# Patient Record
Sex: FEMALE | Race: WHITE | NOT HISPANIC OR LATINO | Employment: OTHER | URBAN - METROPOLITAN AREA
[De-identification: names, ages, dates, MRNs, and addresses within clinical notes are randomized per-mention and may not be internally consistent; named-entity substitution may affect disease eponyms.]

---

## 2017-07-20 ENCOUNTER — LAB VISIT (OUTPATIENT)
Dept: LAB | Facility: HOSPITAL | Age: 62
End: 2017-07-20
Attending: INTERNAL MEDICINE
Payer: COMMERCIAL

## 2017-07-20 ENCOUNTER — OFFICE VISIT (OUTPATIENT)
Dept: FAMILY MEDICINE | Facility: CLINIC | Age: 62
End: 2017-07-20
Payer: COMMERCIAL

## 2017-07-20 VITALS
TEMPERATURE: 98 F | WEIGHT: 170 LBS | OXYGEN SATURATION: 96 % | RESPIRATION RATE: 18 BRPM | HEIGHT: 71 IN | DIASTOLIC BLOOD PRESSURE: 100 MMHG | SYSTOLIC BLOOD PRESSURE: 166 MMHG | BODY MASS INDEX: 23.8 KG/M2 | HEART RATE: 76 BPM

## 2017-07-20 DIAGNOSIS — Z12.11 ENCOUNTER FOR SCREENING COLONOSCOPY: ICD-10-CM

## 2017-07-20 DIAGNOSIS — S29.012A STRAIN OF MUSCLE AND TENDON OF BACK WALL OF THORAX, INITIAL ENCOUNTER: Primary | ICD-10-CM

## 2017-07-20 DIAGNOSIS — R03.0 ELEVATED BLOOD PRESSURE READING: ICD-10-CM

## 2017-07-20 DIAGNOSIS — Z11.59 NEED FOR HEPATITIS C SCREENING TEST: ICD-10-CM

## 2017-07-20 DIAGNOSIS — Z00.00 HEALTH CARE MAINTENANCE: ICD-10-CM

## 2017-07-20 DIAGNOSIS — Z13.220 LIPID SCREENING: ICD-10-CM

## 2017-07-20 DIAGNOSIS — Z12.31 ENCOUNTER FOR SCREENING MAMMOGRAM FOR BREAST CANCER: ICD-10-CM

## 2017-07-20 DIAGNOSIS — Z12.4 PAP SMEAR FOR CERVICAL CANCER SCREENING: ICD-10-CM

## 2017-07-20 LAB
ALBUMIN SERPL BCP-MCNC: 4 G/DL
ALP SERPL-CCNC: 81 U/L
ALT SERPL W/O P-5'-P-CCNC: 18 U/L
ANION GAP SERPL CALC-SCNC: 9 MMOL/L
AST SERPL-CCNC: 24 U/L
BASOPHILS # BLD AUTO: 0.03 K/UL
BASOPHILS NFR BLD: 0.5 %
BILIRUB SERPL-MCNC: 0.8 MG/DL
BUN SERPL-MCNC: 18 MG/DL
CALCIUM SERPL-MCNC: 9.6 MG/DL
CHLORIDE SERPL-SCNC: 107 MMOL/L
CHOLEST/HDLC SERPL: 3.1 {RATIO}
CO2 SERPL-SCNC: 25 MMOL/L
CREAT SERPL-MCNC: 0.9 MG/DL
DIFFERENTIAL METHOD: NORMAL
EOSINOPHIL # BLD AUTO: 0.2 K/UL
EOSINOPHIL NFR BLD: 3.6 %
ERYTHROCYTE [DISTWIDTH] IN BLOOD BY AUTOMATED COUNT: 13.3 %
EST. GFR  (AFRICAN AMERICAN): >60 ML/MIN/1.73 M^2
EST. GFR  (NON AFRICAN AMERICAN): >60 ML/MIN/1.73 M^2
GLUCOSE SERPL-MCNC: 72 MG/DL
HCT VFR BLD AUTO: 42.3 %
HDL/CHOLESTEROL RATIO: 32.3 %
HDLC SERPL-MCNC: 223 MG/DL
HDLC SERPL-MCNC: 72 MG/DL
HGB BLD-MCNC: 14.1 G/DL
LDLC SERPL CALC-MCNC: 136.8 MG/DL
LYMPHOCYTES # BLD AUTO: 1.5 K/UL
LYMPHOCYTES NFR BLD: 26.3 %
MCH RBC QN AUTO: 30.1 PG
MCHC RBC AUTO-ENTMCNC: 33.3 G/DL
MCV RBC AUTO: 90 FL
MONOCYTES # BLD AUTO: 0.5 K/UL
MONOCYTES NFR BLD: 7.9 %
NEUTROPHILS # BLD AUTO: 3.6 K/UL
NEUTROPHILS NFR BLD: 61.7 %
NONHDLC SERPL-MCNC: 151 MG/DL
PLATELET # BLD AUTO: 293 K/UL
PMV BLD AUTO: 10.9 FL
POTASSIUM SERPL-SCNC: 4.5 MMOL/L
PROT SERPL-MCNC: 7.6 G/DL
RBC # BLD AUTO: 4.68 M/UL
SODIUM SERPL-SCNC: 141 MMOL/L
TRIGL SERPL-MCNC: 71 MG/DL
TSH SERPL DL<=0.005 MIU/L-ACNC: 2.6 UIU/ML
WBC # BLD AUTO: 5.79 K/UL

## 2017-07-20 PROCEDURE — 80053 COMPREHEN METABOLIC PANEL: CPT

## 2017-07-20 PROCEDURE — 80061 LIPID PANEL: CPT

## 2017-07-20 PROCEDURE — 84443 ASSAY THYROID STIM HORMONE: CPT

## 2017-07-20 PROCEDURE — 85025 COMPLETE CBC W/AUTO DIFF WBC: CPT

## 2017-07-20 PROCEDURE — 36415 COLL VENOUS BLD VENIPUNCTURE: CPT

## 2017-07-20 PROCEDURE — 99999 PR PBB SHADOW E&M-EST. PATIENT-LVL IV: CPT | Mod: PBBFAC,,, | Performed by: INTERNAL MEDICINE

## 2017-07-20 PROCEDURE — 99203 OFFICE O/P NEW LOW 30 MIN: CPT | Mod: S$GLB,,, | Performed by: INTERNAL MEDICINE

## 2017-07-20 PROCEDURE — 86803 HEPATITIS C AB TEST: CPT

## 2017-07-20 NOTE — PROGRESS NOTES
SUBJECTIVE     Chief Complaint   Patient presents with    Establish Care    Flank Pain     R side sharp piercing pain x 2 days       HPI  Maryann Grant is a 61 y.o. female with multiple medical diagnoses as listed in the medical history and problem list that presents for evaluation of R flank pain x 2 days. Pt reports pain is sharp and stabbing at a 10/10, but has decreased to a 4-5/10. Pain is improved with sitting up, but worsened with laying on the R side and taking deep breaths. Pt has never had anything like this happen to her before. Pain is intermittent in nature without any radiation. Pt did not take any meds for her symptoms. Denies any urinary symptoms/complaints or hematuria. Pt did  2 packs of 16.9 oz 40 pack of water at the grocery store yesterday with proper lifting techniques. Denies any recent trauma or falls.     PAST MEDICAL HISTORY:  Past Medical History:   Diagnosis Date    History of bleeding ulcers        PAST SURGICAL HISTORY:  Past Surgical History:   Procedure Laterality Date    HYSTERECTOMY      TONSILLECTOMY         SOCIAL HISTORY:  Social History     Social History    Marital status:      Spouse name: N/A    Number of children: N/A    Years of education: N/A     Occupational History    Not on file.     Social History Main Topics    Smoking status: Never Smoker    Smokeless tobacco: Not on file    Alcohol use Not on file    Drug use: No    Sexual activity: Yes     Partners: Male     Birth control/ protection: Post-menopausal     Other Topics Concern    Not on file     Social History Narrative    No narrative on file       FAMILY HISTORY:  Family History   Problem Relation Age of Onset    Heart disease Mother     Stroke Mother     Diabetes Mother     Heart disease Father     Hypertension Father        ALLERGIES AND MEDICATIONS: updated and reviewed.  Review of patient's allergies indicates:  No Known Allergies  No current outpatient prescriptions on file.  "    No current facility-administered medications for this visit.        ROS  Review of Systems   Constitutional: Negative for chills and fever.   HENT: Negative for hearing loss and sore throat.    Eyes: Negative for visual disturbance.   Respiratory: Negative for cough and shortness of breath.    Cardiovascular: Negative for chest pain, palpitations and leg swelling.   Gastrointestinal: Negative for abdominal pain, constipation, diarrhea, nausea and vomiting.   Genitourinary: Negative for dysuria, frequency and urgency.   Musculoskeletal: Negative for arthralgias, joint swelling and myalgias.        R flank pain   Skin: Negative for rash and wound.   Neurological: Negative for headaches.   Psychiatric/Behavioral: Negative for agitation and confusion. The patient is not nervous/anxious.          OBJECTIVE     Physical Exam  Vitals:    07/20/17 0948   BP: (!) 166/100   Pulse: 76   Resp: 18   Temp: 98.1 °F (36.7 °C)    Body mass index is 23.71 kg/m².  Weight: 77.1 kg (169 lb 15.6 oz)   Height: 5' 11" (180.3 cm)     Physical Exam   Constitutional: She is oriented to person, place, and time. She appears well-developed and well-nourished. No distress.   HENT:   Head: Normocephalic and atraumatic.   Right Ear: External ear normal.   Left Ear: External ear normal.   Nose: Nose normal.   Mouth/Throat: Oropharynx is clear and moist.   Eyes: Conjunctivae and EOM are normal. Right eye exhibits no discharge. Left eye exhibits no discharge. No scleral icterus.   Neck: Normal range of motion. Neck supple. No JVD present. No tracheal deviation present.   Cardiovascular: Normal rate, regular rhythm and intact distal pulses.  Exam reveals no gallop and no friction rub.    No murmur heard.  Pulmonary/Chest: Effort normal and breath sounds normal. No respiratory distress. She has no wheezes.   Abdominal: Soft. Bowel sounds are normal. She exhibits no distension and no mass. There is no tenderness. There is no rebound, no guarding and " no CVA tenderness.   Musculoskeletal: Normal range of motion. She exhibits tenderness (R flank pain slightly TTP over ~T11-T12). She exhibits no edema or deformity.   Neurological: She is alert and oriented to person, place, and time. She exhibits normal muscle tone. Coordination normal.   Skin: Skin is warm and dry. No rash noted. No erythema.   Psychiatric: She has a normal mood and affect. Her behavior is normal. Judgment and thought content normal.         Health Maintenance       Date Due Completion Date    Hepatitis C Screening 1955 ---    TETANUS VACCINE 10/08/1972 ---    Mammogram 10/08/1994 ---    Colonoscopy 10/08/2004 ---    Zoster Vaccine 10/08/2014 ---    Influenza Vaccine 08/01/2017 11/7/2014    Lipid Panel 09/15/2017 9/15/2012            ASSESSMENT     61 y.o. female with     1. Strain of muscle and tendon of back wall of thorax, initial encounter    2. Elevated blood pressure reading    3. Encounter for screening mammogram for breast cancer    4. Encounter for screening colonoscopy    5. Pap smear for cervical cancer screening    6. Need for hepatitis C screening test    7. Health care maintenance    8. Lipid screening        PLAN:     1. Strain of muscle and tendon of back wall of thorax, initial encounter  - Pt did some heavy lifting yesterday and likely sustained a muscle strain to the R flank area  - Advised on conservative management with rest and ice for the next 48-72 hours; pt to then switch to hot compresses with care not to burn herself  - Okay to take OTC NSAIDs prn pain    2. Elevated blood pressure reading  - BP elevated above goal of <140/90; possibly 2/2 pain  - Monitor    3. Encounter for screening mammogram for breast cancer  - Mammo Digital Screening Bilat with Tomosynthesis CAD; Future    4. Encounter for screening colonoscopy  - Occult blood x 1, stool; Future  - Occult blood x 1, stool; Future  - Occult blood x 1, stool; Future    5. Pap smear for cervical cancer  screening  - Ambulatory referral to Obstetrics / Gynecology    6. Need for hepatitis C screening test  - Hepatitis C antibody; Future    7. Health care maintenance  - CBC auto differential; Future  - Comprehensive metabolic panel; Future  - TSH; Future    8. Lipid screening  - Lipid panel; Future        RTC in 4 weeks for nurse visit BP check     Risa Cochran MD  07/20/2017 10:24 AM        No Follow-up on file.

## 2017-07-21 LAB — HCV AB SERPL QL IA: NEGATIVE

## 2017-07-25 ENCOUNTER — PATIENT MESSAGE (OUTPATIENT)
Dept: FAMILY MEDICINE | Facility: CLINIC | Age: 62
End: 2017-07-25

## 2017-08-01 ENCOUNTER — APPOINTMENT (OUTPATIENT)
Dept: RADIOLOGY | Facility: HOSPITAL | Age: 62
End: 2017-08-01
Attending: INTERNAL MEDICINE
Payer: COMMERCIAL

## 2017-08-01 ENCOUNTER — OFFICE VISIT (OUTPATIENT)
Dept: FAMILY MEDICINE | Facility: CLINIC | Age: 62
End: 2017-08-01
Payer: COMMERCIAL

## 2017-08-01 VITALS
HEIGHT: 71 IN | WEIGHT: 168.88 LBS | HEART RATE: 96 BPM | DIASTOLIC BLOOD PRESSURE: 84 MMHG | TEMPERATURE: 98 F | SYSTOLIC BLOOD PRESSURE: 142 MMHG | BODY MASS INDEX: 23.64 KG/M2 | OXYGEN SATURATION: 95 %

## 2017-08-01 DIAGNOSIS — R10.9 ACUTE RIGHT FLANK PAIN: Primary | ICD-10-CM

## 2017-08-01 DIAGNOSIS — E78.5 HYPERLIPIDEMIA, UNSPECIFIED HYPERLIPIDEMIA TYPE: ICD-10-CM

## 2017-08-01 DIAGNOSIS — R03.0 ELEVATED BLOOD PRESSURE READING: ICD-10-CM

## 2017-08-01 DIAGNOSIS — R10.9 ACUTE RIGHT FLANK PAIN: ICD-10-CM

## 2017-08-01 PROCEDURE — 99214 OFFICE O/P EST MOD 30 MIN: CPT | Mod: S$GLB,,, | Performed by: INTERNAL MEDICINE

## 2017-08-01 PROCEDURE — 71100 X-RAY EXAM RIBS UNI 2 VIEWS: CPT | Mod: TC,PN

## 2017-08-01 PROCEDURE — 99999 PR PBB SHADOW E&M-EST. PATIENT-LVL IV: CPT | Mod: PBBFAC,,, | Performed by: INTERNAL MEDICINE

## 2017-08-01 PROCEDURE — 87086 URINE CULTURE/COLONY COUNT: CPT

## 2017-08-01 PROCEDURE — 71100 X-RAY EXAM RIBS UNI 2 VIEWS: CPT | Mod: 26,,, | Performed by: RADIOLOGY

## 2017-08-01 PROCEDURE — 81000 URINALYSIS NONAUTO W/SCOPE: CPT

## 2017-08-01 RX ORDER — ACETAMINOPHEN AND CODEINE PHOSPHATE 300; 30 MG/1; MG/1
1 TABLET ORAL DAILY PRN
Qty: 10 TABLET | Refills: 0 | Status: SHIPPED | OUTPATIENT
Start: 2017-08-01 | End: 2017-08-11

## 2017-08-01 NOTE — PROGRESS NOTES
SUBJECTIVE     Chief Complaint   Patient presents with    Flank Pain     Still have same pain since last OV       HPI  Maryann Grant is a 61 y.o. female with multiple medical diagnoses as listed in the medical history and problem list that presents for follow-up of persistent R flank pain x 2 weeks. Pt reports her pain is like a bruising pain at a 8-10/10 only when laying on or rolling onto her sides. Pt has been taking Tylenol and Ibuprofen without any improvement symptoms. She has been drinking lots of water but has not had any improvement of pain. Denies any dysuria, increased urinary frequency, urgency, or hematuria. Of note, pt had been treated with Abx( 2 days of Cipro followed by 7 days of Bactrim, because the Cipro caused shooting pains in the L flank) 2 weeks prior to the start of this pain. Denies any fever, chills, or night sweats.    PAST MEDICAL HISTORY:  Past Medical History:   Diagnosis Date    History of bleeding ulcers        PAST SURGICAL HISTORY:  Past Surgical History:   Procedure Laterality Date    HYSTERECTOMY      TONSILLECTOMY         SOCIAL HISTORY:  Social History     Social History    Marital status:      Spouse name: N/A    Number of children: N/A    Years of education: N/A     Occupational History    Not on file.     Social History Main Topics    Smoking status: Never Smoker    Smokeless tobacco: Not on file    Alcohol use Not on file    Drug use: No    Sexual activity: Yes     Partners: Male     Birth control/ protection: Post-menopausal     Other Topics Concern    Not on file     Social History Narrative    No narrative on file       FAMILY HISTORY:  Family History   Problem Relation Age of Onset    Heart disease Mother     Stroke Mother     Diabetes Mother     Heart disease Father     Hypertension Father        ALLERGIES AND MEDICATIONS: updated and reviewed.  Review of patient's allergies indicates:  No Known Allergies  Current Outpatient Prescriptions  "  Medication Sig Dispense Refill    acetaminophen-codeine 300-30mg (TYLENOL #3) 300-30 mg Tab Take 1 tablet by mouth daily as needed. 10 tablet 0     No current facility-administered medications for this visit.        ROS  Review of Systems   Constitutional: Negative for chills and fever.   HENT: Negative for hearing loss and sore throat.    Eyes: Negative for visual disturbance.   Respiratory: Negative for cough and shortness of breath.    Cardiovascular: Negative for chest pain, palpitations and leg swelling.   Gastrointestinal: Negative for abdominal pain, constipation, diarrhea, nausea and vomiting.   Genitourinary: Negative for dysuria, frequency and urgency.   Musculoskeletal: Negative for arthralgias, joint swelling and myalgias.        R flank pain   Skin: Negative for rash and wound.   Neurological: Negative for headaches.   Psychiatric/Behavioral: Negative for agitation and confusion. The patient is not nervous/anxious.          OBJECTIVE     Physical Exam  Vitals:    08/01/17 1307   BP: (!) 142/84   Pulse: 96   Temp: 98.3 °F (36.8 °C)    Body mass index is 23.55 kg/m².  Weight: 76.6 kg (168 lb 14 oz)   Height: 5' 11" (180.3 cm)     Physical Exam   Constitutional: She is oriented to person, place, and time. She appears well-developed and well-nourished. No distress.   HENT:   Head: Normocephalic and atraumatic.   Right Ear: External ear normal.   Left Ear: External ear normal.   Nose: Nose normal.   Mouth/Throat: Oropharynx is clear and moist.   Eyes: Conjunctivae and EOM are normal. Right eye exhibits no discharge. Left eye exhibits no discharge. No scleral icterus.   Neck: Normal range of motion. Neck supple. No JVD present. No tracheal deviation present.   Cardiovascular: Normal rate, regular rhythm and intact distal pulses.  Exam reveals no gallop and no friction rub.    No murmur heard.  Pulmonary/Chest: Effort normal and breath sounds normal. No respiratory distress. She has no wheezes.   Abdominal: " Soft. Bowel sounds are normal. She exhibits no distension and no mass. There is no tenderness. There is CVA tenderness (right). There is no rebound and no guarding.   Musculoskeletal: Normal range of motion. She exhibits no edema, tenderness or deformity.   Neurological: She is alert and oriented to person, place, and time. She exhibits normal muscle tone. Coordination normal.   Skin: Skin is warm and dry. No rash noted. No erythema.   Psychiatric: She has a normal mood and affect. Her behavior is normal. Judgment and thought content normal.         Health Maintenance       Date Due Completion Date    TETANUS VACCINE 10/08/1972 ---    Mammogram 10/08/1994 ---    Colonoscopy 10/08/2004 ---    Zoster Vaccine 10/08/2014 ---    Influenza Vaccine 08/01/2017 11/7/2014    Lipid Panel 07/20/2022 7/20/2017            ASSESSMENT     61 y.o. female with     1. Acute right flank pain    2. Hyperlipidemia, unspecified hyperlipidemia type    3. Elevated blood pressure reading        PLAN:     1. Acute right flank pain  - Pt with continued pain despite conservative measurements with NSAIDs and warm compresses  - POCT URINE DIPSTICK WITHOUT MICROSCOPE; 1+leuk, neg nit/blood  - Urinalysis  - Urine culture  - US Retroperitoneal Complete (Kidney and; Future  - X-Ray Ribs 2 View Right; Future  - acetaminophen-codeine 300-30mg (TYLENOL #3) 300-30 mg Tab; Take 1 tablet by mouth daily as needed.  Dispense: 10 tablet; Refill: 0    2. Hyperlipidemia, unspecified hyperlipidemia type  -  .8  - Pt would like to try conservative measures with lifestyle changes by eating a prudent diet and exercising  - Will repeat lipid panel in 6 months to monitor for improvement and if none, plan to start anti-hyperlipidemic meds    3. Elevated blood pressure reading  - BP elevated above goal of <140/90; possibly 2/2 pain  - Plan to get pain control and then re-assess  - Monitor      RTC in 2 weeks for repeat assessment of current treatment plan        Risa Cochran MD  08/01/2017 1:23 PM        No Follow-up on file.

## 2017-08-02 LAB
BILIRUB UR QL STRIP: NEGATIVE
CLARITY UR: CLEAR
COLOR UR: ABNORMAL
GLUCOSE UR QL STRIP: NEGATIVE
HGB UR QL STRIP: NEGATIVE
KETONES UR QL STRIP: NEGATIVE
LEUKOCYTE ESTERASE UR QL STRIP: ABNORMAL
MICROSCOPIC COMMENT: NORMAL
NITRITE UR QL STRIP: NEGATIVE
PH UR STRIP: 6 [PH] (ref 5–8)
PROT UR QL STRIP: NEGATIVE
SP GR UR STRIP: 1 (ref 1–1.03)
SQUAMOUS #/AREA URNS HPF: 1 /HPF
URN SPEC COLLECT METH UR: ABNORMAL
UROBILINOGEN UR STRIP-ACNC: NEGATIVE EU/DL
WBC #/AREA URNS HPF: 0 /HPF (ref 0–5)

## 2017-08-04 LAB — BACTERIA UR CULT: NORMAL

## 2017-08-07 ENCOUNTER — TELEPHONE (OUTPATIENT)
Dept: FAMILY MEDICINE | Facility: CLINIC | Age: 62
End: 2017-08-07

## 2017-08-07 NOTE — TELEPHONE ENCOUNTER
----- Message from Amina Caal sent at 8/7/2017  9:55 AM CDT -----  Contact: self  Patient states she is no longer in pain , should she still have renal u/s done ?  It is scheduled for tomorrow. [phone]    LL

## 2017-08-07 NOTE — TELEPHONE ENCOUNTER
Pt advised to hold as she reports pain has mostly resolved. No urinary symptoms, recent kidney function also normal. She will let us know if the pain returns and we can reorder then

## 2017-08-08 ENCOUNTER — HOSPITAL ENCOUNTER (OUTPATIENT)
Dept: RADIOLOGY | Facility: HOSPITAL | Age: 62
Discharge: HOME | End: 2017-08-08
Attending: INTERNAL MEDICINE
Payer: COMMERCIAL

## 2017-08-08 DIAGNOSIS — Z12.31 ENCOUNTER FOR SCREENING MAMMOGRAM FOR BREAST CANCER: ICD-10-CM

## 2017-08-08 PROCEDURE — 77067 SCR MAMMO BI INCL CAD: CPT | Mod: TC

## 2017-08-08 PROCEDURE — 77063 BREAST TOMOSYNTHESIS BI: CPT | Mod: 26,,, | Performed by: RADIOLOGY

## 2017-08-08 PROCEDURE — 77067 SCR MAMMO BI INCL CAD: CPT | Mod: 26,,, | Performed by: RADIOLOGY

## 2017-08-09 ENCOUNTER — OFFICE VISIT (OUTPATIENT)
Dept: OBSTETRICS AND GYNECOLOGY | Facility: CLINIC | Age: 62
End: 2017-08-09
Payer: COMMERCIAL

## 2017-08-09 VITALS
SYSTOLIC BLOOD PRESSURE: 135 MMHG | BODY MASS INDEX: 23.46 KG/M2 | HEIGHT: 71 IN | DIASTOLIC BLOOD PRESSURE: 83 MMHG | WEIGHT: 167.56 LBS

## 2017-08-09 DIAGNOSIS — Z01.419 ENCOUNTER FOR ANNUAL ROUTINE GYNECOLOGICAL EXAMINATION: Primary | ICD-10-CM

## 2017-08-09 PROCEDURE — 99386 PREV VISIT NEW AGE 40-64: CPT | Mod: S$GLB,,, | Performed by: OBSTETRICS & GYNECOLOGY

## 2017-08-09 PROCEDURE — 99999 PR PBB SHADOW E&M-EST. PATIENT-LVL III: CPT | Mod: PBBFAC,,, | Performed by: OBSTETRICS & GYNECOLOGY

## 2017-08-09 NOTE — LETTER
August 9, 2017      Risa Cochran MD  [address]  [address]  [place] [address]           [facility]  [address] [facility] [address]  [place] [address]  Phone: [phone]          Patient: Maryann Grant   MR Number: [id number]   YOB: 1955   Date of Visit: 8/9/2017       Dear Dr. Risa Cochran:    Thank you for referring Maryann Grant to me for evaluation. Attached you will find relevant portions of my assessment and plan of care.    If you have questions, please do not hesitate to call me. I look forward to following Maryann Grant along with you.    Sincerely,    Gracie Kimball MD    Enclosure  CC:  No Recipients    If you would like to receive this communication electronically, please contact [email or web address] or [phone] to request more information on [facility] Link access.    For providers and/or their staff who would like to refer a patient to [facility], please contact us through our one-stop-shop provider referral line, [facility], at [phone].    If you feel you have received this communication in error or would no longer like to receive these types of communications, please e-mail [email or web address]

## 2017-08-09 NOTE — PROGRESS NOTES
Subjective:       Patient ID: Maryann Grant is a 61 y.o. female.    Chief Complaint:  Annual Exam and Hemorrhoids      History of Present Illness  HPI  Maryann Grant is a 61 y.o. female  here for annual exam.    She had hysterectomy after hemorrhage during delivery.   denies break through bleeding.   denies vaginal itching or irritation.  denies vaginal discharge.  She is sexually active.   History of abnormal pap: No  Last Pap: approximate date ~3-4 years ago and was normal  Last MMG: normal--routine follow-up in 12 months  Last Colonoscopy:  Never done.   Pt has hemorrhoids, doesn't use anything regularly. Has used preparationH in the past.         GYN & OB History  No LMP recorded. Patient has had a hysterectomy.   Date of Last Pap: No result found    OB History    Para Term  AB Living   2 2 2         SAB TAB Ectopic Multiple Live Births                  # Outcome Date GA Lbr Umer/2nd Weight Sex Delivery Anes PTL Lv   2 Term            1 Term                   Review of Systems  Review of Systems   Constitutional: Negative for chills, fatigue and fever.   Respiratory: Negative for shortness of breath.    Cardiovascular: Negative for chest pain.   Gastrointestinal: Negative for abdominal pain, constipation, diarrhea, nausea and vomiting.   Genitourinary: Negative for dyspareunia, dysuria, frequency, vaginal bleeding, vaginal discharge, vaginal pain, postmenopausal bleeding and vaginal odor.   Breast: Negative for breast mass, breast pain, nipple discharge and skin changes          Objective:    Physical Exam:   Constitutional: She appears well-developed and well-nourished. No distress.    HENT:   Head: Normocephalic and atraumatic.    Eyes: EOM are normal.    Neck: Normal range of motion. Neck supple. No thyromegaly present.    Cardiovascular: Normal rate and normal heart sounds.  Exam reveals no gallop and no friction rub.    No murmur heard.   Pulmonary/Chest: Effort normal and breath  sounds normal. She has no wheezes. She has no rales. Right breast exhibits no inverted nipple, no mass, no nipple discharge, no skin change, no tenderness, presence, no bleeding and no swelling. Left breast exhibits no inverted nipple, no mass, no nipple discharge, no skin change, no tenderness, presence, no bleeding and no swelling. Breasts are symmetrical.        Abdominal: Soft. Normal appearance and bowel sounds are normal. She exhibits no distension. There is no hepatosplenomegaly. There is no tenderness. There is no rigidity, no rebound and no guarding.     Genitourinary: There is no rash, tenderness, lesion or injury on the right labia. There is no rash, tenderness, lesion or injury on the left labia. Uterus is absent. Right adnexum displays no mass, no tenderness and no fullness. Left adnexum displays no mass, no tenderness and no fullness. No tenderness or bleeding in the vagina. No foreign body in the vagina. No vaginal discharge found. Vaginal cuff normal.Cervix exhibits absence.              Lymphadenopathy:     She has no cervical adenopathy.     She has no axillary adenopathy.      Psychiatric: She has a normal mood and affect.          Assessment:        1. Encounter for annual routine gynecological examination              Plan:      1. Encounter for annual routine gynecological examination  - Pap not indicated. Discussed ASCCP guidelines for screening to stop after hysterectomy for benign reasons.  - MMG up to date  - Colonoscopy pt has discussed with PCP, not desired at this time.           Return in about 1 year (around 8/9/2018) for Annual exam.

## 2018-08-20 DIAGNOSIS — Z12.11 COLON CANCER SCREENING: ICD-10-CM

## 2019-02-05 ENCOUNTER — HOSPITAL ENCOUNTER (EMERGENCY)
Facility: HOSPITAL | Age: 64
Discharge: HOME | End: 2019-02-05
Attending: EMERGENCY MEDICINE
Payer: COMMERCIAL

## 2019-02-05 VITALS
OXYGEN SATURATION: 96 % | BODY MASS INDEX: 23.1 KG/M2 | TEMPERATURE: 98 F | WEIGHT: 165 LBS | HEIGHT: 71 IN | DIASTOLIC BLOOD PRESSURE: 58 MMHG | HEART RATE: 74 BPM | RESPIRATION RATE: 16 BRPM | SYSTOLIC BLOOD PRESSURE: 117 MMHG

## 2019-02-05 DIAGNOSIS — S42.342A CLOSED DISPLACED SPIRAL FRACTURE OF SHAFT OF LEFT HUMERUS, INITIAL ENCOUNTER: Primary | ICD-10-CM

## 2019-02-05 DIAGNOSIS — M79.603 ARM PAIN: ICD-10-CM

## 2019-02-05 PROCEDURE — 96375 TX/PRO/DX INJ NEW DRUG ADDON: CPT

## 2019-02-05 PROCEDURE — 96374 THER/PROPH/DIAG INJ IV PUSH: CPT

## 2019-02-05 PROCEDURE — 63600175 PHARM REV CODE 636 W HCPCS: Performed by: EMERGENCY MEDICINE

## 2019-02-05 PROCEDURE — 96376 TX/PRO/DX INJ SAME DRUG ADON: CPT

## 2019-02-05 PROCEDURE — 29105 APPLICATION LONG ARM SPLINT: CPT | Mod: LT

## 2019-02-05 PROCEDURE — 99284 EMERGENCY DEPT VISIT MOD MDM: CPT | Mod: 25

## 2019-02-05 RX ORDER — HYDROMORPHONE HYDROCHLORIDE 2 MG/ML
0.5 INJECTION, SOLUTION INTRAMUSCULAR; INTRAVENOUS; SUBCUTANEOUS
Status: COMPLETED | OUTPATIENT
Start: 2019-02-05 | End: 2019-02-05

## 2019-02-05 RX ORDER — OXYCODONE AND ACETAMINOPHEN 10; 325 MG/1; MG/1
1 TABLET ORAL
Status: DISCONTINUED | OUTPATIENT
Start: 2019-02-05 | End: 2019-02-05

## 2019-02-05 RX ORDER — MORPHINE SULFATE 10 MG/ML
5 INJECTION INTRAMUSCULAR; INTRAVENOUS; SUBCUTANEOUS
Status: COMPLETED | OUTPATIENT
Start: 2019-02-05 | End: 2019-02-05

## 2019-02-05 RX ORDER — ONDANSETRON 2 MG/ML
4 INJECTION INTRAMUSCULAR; INTRAVENOUS
Status: COMPLETED | OUTPATIENT
Start: 2019-02-05 | End: 2019-02-05

## 2019-02-05 RX ORDER — ONDANSETRON 4 MG/1
4 TABLET, ORALLY DISINTEGRATING ORAL EVERY 8 HOURS PRN
Qty: 15 TABLET | Refills: 0 | Status: ON HOLD | OUTPATIENT
Start: 2019-02-05 | End: 2019-12-31 | Stop reason: HOSPADM

## 2019-02-05 RX ORDER — HYDROMORPHONE HYDROCHLORIDE 2 MG/ML
1 INJECTION, SOLUTION INTRAMUSCULAR; INTRAVENOUS; SUBCUTANEOUS
Status: COMPLETED | OUTPATIENT
Start: 2019-02-05 | End: 2019-02-05

## 2019-02-05 RX ORDER — OXYCODONE AND ACETAMINOPHEN 10; 325 MG/1; MG/1
1 TABLET ORAL EVERY 6 HOURS PRN
Qty: 30 TABLET | Refills: 0 | Status: SHIPPED | OUTPATIENT
Start: 2019-02-05 | End: 2019-07-09

## 2019-02-05 RX ADMIN — HYDROMORPHONE HYDROCHLORIDE 0.5 MG: 2 INJECTION, SOLUTION INTRAMUSCULAR; INTRAVENOUS; SUBCUTANEOUS at 07:02

## 2019-02-05 RX ADMIN — MORPHINE SULFATE 5 MG: 10 INJECTION INTRAVENOUS at 10:02

## 2019-02-05 RX ADMIN — ONDANSETRON 4 MG: 2 INJECTION INTRAMUSCULAR; INTRAVENOUS at 09:02

## 2019-02-05 RX ADMIN — HYDROMORPHONE HYDROCHLORIDE 1 MG: 2 INJECTION, SOLUTION INTRAMUSCULAR; INTRAVENOUS; SUBCUTANEOUS at 08:02

## 2019-02-05 NOTE — ED PROVIDER NOTES
Encounter Date: 2/5/2019    SCRIBE #1 NOTE: I, Chrisitn Tena, am scribing for, and in the presence of,  Bhanu Perkins MD . I have scribed the following portions of the note - Other sections scribed: HPI, ROS .       History     Chief Complaint   Patient presents with    Fall     Pt presents to the ED with a home madetowel sling to her LUE. Pt reports she tripped over a chair, falling onto her left arm. Reports hitting her arm on the door frame/ground. Denies hitting her head. Pt with complaints of 10/10 pain on scale. Pt is unable to perform ROM to her LUE.     Arm Injury     CC: Fall/ Arm Injury     HPI: 64 y/o F who has a past medical history of History of bleeding ulcers presents to the ED for an emergent evaluation of a fall which has caused her L arm pain. Pain is exacerbated with movement and palpation. Pt was ambulatory after the scene. Pt denies head trauma/ LOC. No neck pain. No rib pain. No abdominal pain. Pt has no other complaints at this time.         The history is provided by the patient. No  was used.     Review of patient's allergies indicates:  No Known Allergies  Past Medical History:   Diagnosis Date    History of bleeding ulcers      Past Surgical History:   Procedure Laterality Date    HYSTERECTOMY      TONSILLECTOMY      VAGINAL DELIVERY       Family History   Problem Relation Age of Onset    Heart disease Mother     Stroke Mother     Diabetes Mother     Heart disease Father     Hypertension Father      Social History     Tobacco Use    Smoking status: Never Smoker    Smokeless tobacco: Never Used   Substance Use Topics    Alcohol use: No    Drug use: No     Review of Systems   Constitutional: Negative for appetite change and fever.   HENT: Negative for rhinorrhea and sore throat.    Eyes: Negative for visual disturbance.   Respiratory: Negative for cough and shortness of breath.    Cardiovascular: Negative for chest pain.   Gastrointestinal: Negative for  abdominal pain.   Genitourinary: Negative for dysuria.   Musculoskeletal: Positive for arthralgias (L Arm Pain). Negative for gait problem.   Skin: Negative for rash.   Neurological: Negative for syncope.       Physical Exam     Initial Vitals [02/05/19 0631]   BP Pulse Resp Temp SpO2   (!) 115/59 87 20 97.4 °F (36.3 °C) 100 %      MAP       --         Physical Exam    Nursing note and vitals reviewed.  Constitutional: She appears well-developed and well-nourished.   Eyes: EOM are normal. Pupils are equal, round, and reactive to light.   Neck: Normal range of motion. Neck supple. No thyromegaly present. No JVD present.   No ttp. FROM.    Cardiovascular: Normal rate, regular rhythm, normal heart sounds and intact distal pulses. Exam reveals no gallop and no friction rub.    No murmur heard.  Pulmonary/Chest: Breath sounds normal. No respiratory distress. She has no wheezes. She has no rhonchi. She has no rales. She exhibits no tenderness.   Abdominal: Soft. Bowel sounds are normal. She exhibits no distension. There is no tenderness. There is no rebound and no guarding.   Musculoskeletal: She exhibits edema and tenderness.   Right arm, leg and left leg normal exams. Left upper arm with ttp and swelling to proximal humerus. Normal distal perfusion and hand strength.    Neurological: She is alert and oriented to person, place, and time. She has normal strength. GCS eye subscore is 4. GCS verbal subscore is 5. GCS motor subscore is 6.   Skin: Skin is warm and dry. Capillary refill takes less than 2 seconds.         ED Course   Orthopedic Injury  Date/Time: 2/5/2019 6:19 PM  Performed by: Bhanu Perkins MD  Authorized by: Bhanu Perkins MD     Consent Done?:  Not Needed  Injury:     Injury location:  Upper arm    Location details:  Left upper arm    Injury type:  Fracture    Fracture type: humeral shaft        Pre-procedure assessment:     Neurovascular status: Neurovascularly intact      Distal perfusion:  normal      Neurological function: normal      Range of motion: reduced        Selections made in this section will also lock the Injury type section above.:     Manipulation performed?: No      Immobilization:  Splint    Splint type:  Long arm    Supplies used:  Ortho-Glass    Complications: No      Specimens: No      Implants: No    Post-procedure assessment:     Neurovascular status: Neurovascularly intact      Distal perfusion: normal      Neurological function: normal      Range of motion: splinted      Patient tolerance:  Patient tolerated the procedure well with no immediate complications      Labs Reviewed - No data to display       Imaging Results          X-Ray Humerus 2 View Left (Final result)  Result time 02/05/19 07:30:38    Final result by Sorin Hill MD (02/05/19 07:30:38)                 Impression:      Acute mildly displaced fracture of the proximal humeral diaphysis.  Additional thin lucency extends cranially to the level of the surgical neck which may represent nondisplaced component of the fracture.      Electronically signed by: Sorin Hill MD  Date:    02/05/2019  Time:    07:30             Narrative:    EXAMINATION:  XR HUMERUS 2 VIEW LEFT    CLINICAL HISTORY:  Pain in arm, unspecified    TECHNIQUE:  Two views of the left humerus    COMPARISON:  None    FINDINGS:  There is an acute mildly displaced obliquely oriented minimally comminuted fracture of the proximal humeral diaphysis.  There is approximately one shaft width medial displacement of the distal humerus.  There is a thin lucency extending from the fracture margin to the level of the surgical neck of the more proximal humerus which may represent an additional nondisplaced component of the fracture.  The left humeral head appears well seated within the glenoid.  Left lung demonstrates coarse interstitial markings.                              X-Rays:   Independently Interpreted Readings:   Other Readings:  X-ray left  humerus- Proximal humerus fracture.      Discussed case with Orthopedics on call. Recommends splint and clinic follow up.           Scribe Attestation:   Scribe #1: I performed the above scribed service and the documentation accurately describes the services I performed. I attest to the accuracy of the note.    Attending Attestation:           Physician Attestation for Scribe:  Physician Attestation Statement for Scribe #1: I, Bhanu Perkins MD , reviewed documentation, as scribed by Christin Tena  in my presence, and it is both accurate and complete.                    Clinical Impression:   The primary encounter diagnosis was Closed displaced spiral fracture of shaft of left humerus, initial encounter. A diagnosis of Arm pain was also pertinent to this visit.                             Bhanu Perkins MD  03/05/19 1518

## 2019-02-05 NOTE — ED TRIAGE NOTES
"Pt arrived to the ED via POV from home with c/o fall. Pt states was walking thru the house with lights off and had a fall on her left side and thinks she hit door frame hurting her left arm". Pt denies chest pain/discomfort, dizziness/weakness, SOB, LOC. On admit to ED bed, pt found AAOx3, NAD noted, swelling noted to shoulder.  "

## 2019-07-09 ENCOUNTER — OFFICE VISIT (OUTPATIENT)
Dept: FAMILY MEDICINE | Facility: CLINIC | Age: 64
End: 2019-07-09
Payer: COMMERCIAL

## 2019-07-09 ENCOUNTER — NURSE TRIAGE (OUTPATIENT)
Dept: ADMINISTRATIVE | Facility: CLINIC | Age: 64
End: 2019-07-09

## 2019-07-09 VITALS
DIASTOLIC BLOOD PRESSURE: 84 MMHG | BODY MASS INDEX: 24.19 KG/M2 | HEIGHT: 71 IN | OXYGEN SATURATION: 97 % | SYSTOLIC BLOOD PRESSURE: 136 MMHG | TEMPERATURE: 98 F | HEART RATE: 94 BPM | RESPIRATION RATE: 16 BRPM | WEIGHT: 172.81 LBS

## 2019-07-09 DIAGNOSIS — H81.10 BENIGN PAROXYSMAL POSITIONAL VERTIGO, UNSPECIFIED LATERALITY: ICD-10-CM

## 2019-07-09 DIAGNOSIS — Z12.11 COLON CANCER SCREENING: ICD-10-CM

## 2019-07-09 DIAGNOSIS — H69.90 DYSFUNCTION OF EUSTACHIAN TUBE, UNSPECIFIED LATERALITY: ICD-10-CM

## 2019-07-09 DIAGNOSIS — M94.0 COSTOCHONDRITIS: Primary | ICD-10-CM

## 2019-07-09 DIAGNOSIS — Z12.39 BREAST CANCER SCREENING: ICD-10-CM

## 2019-07-09 PROCEDURE — 99214 OFFICE O/P EST MOD 30 MIN: CPT | Mod: S$GLB,,, | Performed by: PHYSICIAN ASSISTANT

## 2019-07-09 PROCEDURE — 99999 PR PBB SHADOW E&M-EST. PATIENT-LVL IV: CPT | Mod: PBBFAC,,, | Performed by: PHYSICIAN ASSISTANT

## 2019-07-09 PROCEDURE — 99999 PR PBB SHADOW E&M-EST. PATIENT-LVL IV: ICD-10-PCS | Mod: PBBFAC,,, | Performed by: PHYSICIAN ASSISTANT

## 2019-07-09 PROCEDURE — 99214 PR OFFICE/OUTPT VISIT, EST, LEVL IV, 30-39 MIN: ICD-10-PCS | Mod: S$GLB,,, | Performed by: PHYSICIAN ASSISTANT

## 2019-07-09 PROCEDURE — 3008F PR BODY MASS INDEX (BMI) DOCUMENTED: ICD-10-PCS | Mod: CPTII,S$GLB,, | Performed by: PHYSICIAN ASSISTANT

## 2019-07-09 PROCEDURE — 3008F BODY MASS INDEX DOCD: CPT | Mod: CPTII,S$GLB,, | Performed by: PHYSICIAN ASSISTANT

## 2019-07-09 RX ORDER — BACLOFEN 10 MG/1
10 TABLET ORAL NIGHTLY PRN
Qty: 30 TABLET | Refills: 0 | Status: ON HOLD | OUTPATIENT
Start: 2019-07-09 | End: 2019-12-31 | Stop reason: HOSPADM

## 2019-07-09 RX ORDER — MECLIZINE HYDROCHLORIDE 25 MG/1
25 TABLET ORAL 3 TIMES DAILY PRN
Qty: 30 TABLET | Refills: 0 | Status: ON HOLD | OUTPATIENT
Start: 2019-07-09 | End: 2019-12-31 | Stop reason: HOSPADM

## 2019-07-09 RX ORDER — DESLORATADINE 5 MG/1
5 TABLET ORAL DAILY
Qty: 30 TABLET | Refills: 11 | Status: ON HOLD | OUTPATIENT
Start: 2019-07-09 | End: 2019-12-31 | Stop reason: HOSPADM

## 2019-07-09 NOTE — PROGRESS NOTES
Subjective:       Patient ID: Maryann Grant is a 63 y.o. female with multiple medical diagnoses as listed in the medical history and problem list that presents for Abdominal Pain (RUQ this morning) and Dizziness (in the mornings)  .    Chief Complaint: Abdominal Pain (RUQ this morning) and Dizziness (in the mornings)      Abdominal Pain   This is a new problem. The current episode started today (she did hurt the area a few weeks ago lift and hiting a consule but that lasted a coupel days and resolved ). The problem occurs intermittently. The problem has been gradually worsening (since this am stronger ). The pain is located in the RUQ. The quality of the pain is aching (soreness ). The abdominal pain does not radiate. Pertinent negatives include no anorexia, belching, constipation, diarrhea, dysuria, fever, flatus, frequency, hematochezia, hematuria, nausea or vomiting. The pain is aggravated by movement (sneezing ). Relieved by: rest or stillness  She has tried nothing for the symptoms.   Dizziness:   Chronicity:  New  Onset:  In the past 7 days  Progression since onset:  Unchanged  Duration:  5 minutes  Dizziness characteristics: room spinning no ear congestion, no ear pain, no fever, no tinnitus, no nausea, no vomiting, no diaphoresis and no aural fullness.  Exacerbated by: lying down and then getting up   Treatments tried:  Rest    Review of Systems   Constitutional: Negative for chills, diaphoresis and fever.   HENT: Positive for congestion and sinus pressure. Negative for ear pain, postnasal drip, rhinorrhea, sinus pain and tinnitus.    Gastrointestinal: Positive for abdominal pain. Negative for anorexia, constipation, diarrhea, flatus, hematochezia, nausea and vomiting.   Genitourinary: Negative for dysuria, frequency and hematuria.   Neurological: Positive for dizziness.         PAST MEDICAL HISTORY:  Past Medical History:   Diagnosis Date    History of bleeding ulcers        SOCIAL HISTORY:  Social  History     Socioeconomic History    Marital status:      Spouse name: Not on file    Number of children: Not on file    Years of education: Not on file    Highest education level: Not on file   Occupational History    Not on file   Social Needs    Financial resource strain: Not on file    Food insecurity:     Worry: Not on file     Inability: Not on file    Transportation needs:     Medical: Not on file     Non-medical: Not on file   Tobacco Use    Smoking status: Never Smoker    Smokeless tobacco: Never Used   Substance and Sexual Activity    Alcohol use: No    Drug use: No    Sexual activity: Yes     Partners: Male     Birth control/protection: Post-menopausal   Lifestyle    Physical activity:     Days per week: Not on file     Minutes per session: Not on file    Stress: Not on file   Relationships    Social connections:     Talks on phone: Not on file     Gets together: Not on file     Attends Evangelical service: Not on file     Active member of club or organization: Not on file     Attends meetings of clubs or organizations: Not on file     Relationship status: Not on file   Other Topics Concern    Not on file   Social History Narrative    Not on file       ALLERGIES AND MEDICATIONS: updated and reviewed.  Review of patient's allergies indicates:  No Known Allergies  Current Outpatient Medications   Medication Sig Dispense Refill    ondansetron (ZOFRAN-ODT) 4 MG TbDL Take 1 tablet (4 mg total) by mouth every 8 (eight) hours as needed (nausea). 15 tablet 0    baclofen (LIORESAL) 10 MG tablet Take 1 tablet (10 mg total) by mouth nightly as needed. 30 tablet 0    desloratadine (CLARINEX) 5 mg tablet Take 1 tablet (5 mg total) by mouth once daily. 30 tablet 11    meclizine (ANTIVERT) 25 mg tablet Take 1 tablet (25 mg total) by mouth 3 (three) times daily as needed for Dizziness. 30 tablet 0     No current facility-administered medications for this visit.          Objective:   /84  "Comment: sudafed around 9 am  Pulse 94   Temp 98.1 °F (36.7 °C) (Oral)   Resp 16   Ht 5' 11" (1.803 m)   Wt 78.4 kg (172 lb 13.5 oz)   SpO2 97%   BMI 24.11 kg/m²      Physical Exam   HENT:   Head: Normocephalic and atraumatic.   Right Ear: Tympanic membrane, external ear and ear canal normal.   Left Ear: Tympanic membrane, external ear and ear canal normal.   Nose: No mucosal edema or rhinorrhea.   Mouth/Throat: Uvula is midline, oropharynx is clear and moist and mucous membranes are normal.   Eyes: Conjunctivae and EOM are normal.   Cardiovascular: Normal rate and regular rhythm.   Pulmonary/Chest: Effort normal and breath sounds normal.   Abdominal: Soft. Normal appearance and bowel sounds are normal. There is tenderness (some mild ) in the right upper quadrant and right lower quadrant. There is no rigidity, no rebound, no guarding, no CVA tenderness, no tenderness at McBurney's point and negative Sanabria's sign. No hernia.               Assessment:       1. Costochondritis    2. Colon cancer screening    3. Benign paroxysmal positional vertigo, unspecified laterality    4. Dysfunction of Eustachian tube, unspecified laterality    5. Breast cancer screening        Plan:       Costochondritis  -     baclofen (LIORESAL) 10 MG tablet; Take 1 tablet (10 mg total) by mouth nightly as needed.  Dispense: 30 tablet; Refill: 0  Contact if there are new symptoms or worsening and consider imaging from there  Does not seem to be GI in history or exam.     Colon cancer screening  -     Fecal Immunochemical Test (iFOBT); Future; Expected date: 07/09/2019    Benign paroxysmal positional vertigo, unspecified laterality  -     meclizine (ANTIVERT) 25 mg tablet; Take 1 tablet (25 mg total) by mouth 3 (three) times daily as needed for Dizziness.  Dispense: 30 tablet; Refill: 0  Exercise given for home.     Dysfunction of Eustachian tube, unspecified laterality  -     desloratadine (CLARINEX) 5 mg tablet; Take 1 tablet (5 mg " total) by mouth once daily.  Dispense: 30 tablet; Refill: 11  Stop sudafed     Breast cancer screening  -     Mammo Digital Screening Bilat without CA; Future; Expected date: 07/09/2019            No follow-ups on file.

## 2019-07-09 NOTE — TELEPHONE ENCOUNTER
Has a pain in her right side, woke up with it this am, it was minimal, but it is intensifying and constant.  With moving, she rates it 7/10, when not moving rates it 2/10.  Her daughter-in-law, who is a nurse, tells her she needs to be seen.  Right below the breast, to the waist, describes it as flank pain.  She has had dizziness for the last week when she wakes up in the morning and sits up, it's like vertigo.      Reason for Disposition   MODERATE pain (e.g., interferes with normal activities or awakens from sleep)    Protocols used: FLANK PAIN-A-OH

## 2019-07-09 NOTE — PATIENT INSTRUCTIONS
Benign Paroxysmal Positional Vertigo     Your health care provider may move your head in certain ways to treat your BPPV.     Benign paroxysmal positional vertigo (BPPV) is a problem with the inner ear. The inner ear contains the vestibular system. This system is what helps you keep your balance. BPPV causes a feeling of spinning. It is a common problem of the vestibular system.  Understanding the vestibular system  The vestibular system of the ear is made up of very tiny parts. They include the utricle, saccule, and semicircular canals. The utricle is a tiny organ that contains calcium crystals. In some people, the crystals can move into the semicircular canals. When this happens, the system no longer works as it should. This causes BPPV. Benign means it is not life-threatening. Paroxysmal means it happens suddenly. Positional means that it happens when you move your head. Vertigo is a feeling of spinning.  What causes BPPV?  Causes include injury to your head or neck. Other problems with the vestibular system may cause BPPV. In many people, the cause of BPPV is not known.  Symptoms of BPPV  You many have repeated feelings of spinning (vertigo). The vertigo usually lasts less than 1 minute. Some movements, suchas rolling over in bed, can bring on vertigo.  Diagnosing BPPV  Your primary health care provider may diagnose and treat your BPPV. Or you may see an ear, nose, and throat doctor (otolaryngologist). In some cases, you may see a nervous system doctor (neurologist).  The health care provider will ask about your symptoms and your medical history. He or she will examine you. You may have hearing and balance tests. As part of the exam, your health care provider may have you move your head and body in certain ways. If you have BPPV, the movements can bring on vertigo. Your provider will also look for abnormal movements of your eyes. You may have other tests to check your vestibular or nervous systems.  Treatment  for BPPV  Your health care provider may try to move the calcium crystals. This is done by having you move your head and neck in certain ways. This treatment is safe and often works well. You may also be told to do these movements at home. You may still have vertigo for a few weeks. Your health care provider will recheck your symptoms, usually in about a month. Special physical therapy may also be part of treatment. In rare cases surgery may be needed for BPPV that does not go away.     When to call the health care provider  Call your health care provider right away if you have any of these:  · Symptoms that do not go away with treatment  · Symptoms that get worse  · New symptoms   Date Last Reviewed: 3/19/2015  © 2964-3468 [employer]. [address] [place] [address], [place] [address]. All rights reserved. This information is not intended as a substitute for professional medical care. Always follow your healthcare professional's instructions.

## 2019-07-16 ENCOUNTER — TELEPHONE (OUTPATIENT)
Dept: FAMILY MEDICINE | Facility: CLINIC | Age: 64
End: 2019-07-16

## 2019-07-17 ENCOUNTER — OFFICE VISIT (OUTPATIENT)
Dept: FAMILY MEDICINE | Facility: CLINIC | Age: 64
End: 2019-07-17
Payer: COMMERCIAL

## 2019-07-17 VITALS
BODY MASS INDEX: 23.98 KG/M2 | DIASTOLIC BLOOD PRESSURE: 80 MMHG | HEART RATE: 90 BPM | TEMPERATURE: 98 F | RESPIRATION RATE: 17 BRPM | HEIGHT: 71 IN | WEIGHT: 171.31 LBS | SYSTOLIC BLOOD PRESSURE: 140 MMHG | OXYGEN SATURATION: 97 %

## 2019-07-17 DIAGNOSIS — R10.9 ACUTE RIGHT FLANK PAIN: Primary | ICD-10-CM

## 2019-07-17 DIAGNOSIS — R10.11 RIGHT UPPER QUADRANT ABDOMINAL PAIN: ICD-10-CM

## 2019-07-17 DIAGNOSIS — R03.0 ELEVATED BLOOD PRESSURE READING: ICD-10-CM

## 2019-07-17 PROCEDURE — 99999 PR PBB SHADOW E&M-EST. PATIENT-LVL III: CPT | Mod: PBBFAC,,, | Performed by: INTERNAL MEDICINE

## 2019-07-17 PROCEDURE — 3008F PR BODY MASS INDEX (BMI) DOCUMENTED: ICD-10-PCS | Mod: CPTII,S$GLB,, | Performed by: INTERNAL MEDICINE

## 2019-07-17 PROCEDURE — 99214 OFFICE O/P EST MOD 30 MIN: CPT | Mod: S$GLB,,, | Performed by: INTERNAL MEDICINE

## 2019-07-17 PROCEDURE — 99999 PR PBB SHADOW E&M-EST. PATIENT-LVL III: ICD-10-PCS | Mod: PBBFAC,,, | Performed by: INTERNAL MEDICINE

## 2019-07-17 PROCEDURE — 3008F BODY MASS INDEX DOCD: CPT | Mod: CPTII,S$GLB,, | Performed by: INTERNAL MEDICINE

## 2019-07-17 PROCEDURE — 99214 PR OFFICE/OUTPT VISIT, EST, LEVL IV, 30-39 MIN: ICD-10-PCS | Mod: S$GLB,,, | Performed by: INTERNAL MEDICINE

## 2019-07-17 NOTE — PROGRESS NOTES
SUBJECTIVE     Chief Complaint   Patient presents with    Flank Pain     right side       HPI  Maryann Grant is a 63 y.o. female with multiple medical diagnoses as listed in the medical history and problem list that presents for evaluation of R flank pain x 3 weeks. Pt reports assisting her  with some 12 ft boards in the vehicle. Her R flank hit the inner console and she felt an odd sensation to the area. The following week she was moving a bed and experienced R flank pain the following day. Pain then developed persistent bruised type feeling at an 8/10 and intermittent in nature with certain movements with radiation to the RUQ of the abdomen. Pain is worse at night. Pt has been taking/applying a TENS unit and Ibuprofen 600 mg with relief from the Ibuprofen only. Denies any urinary complaints, N/V, diarrhea, constipation, fever, chills, or night sweats. Denies any change/worsening pain associated with meals.     PAST MEDICAL HISTORY:  Past Medical History:   Diagnosis Date    History of bleeding ulcers        PAST SURGICAL HISTORY:  Past Surgical History:   Procedure Laterality Date    HYSTERECTOMY      TONSILLECTOMY      VAGINAL DELIVERY         SOCIAL HISTORY:  Social History     Socioeconomic History    Marital status:      Spouse name: Not on file    Number of children: Not on file    Years of education: Not on file    Highest education level: Not on file   Occupational History    Not on file   Social Needs    Financial resource strain: Not on file    Food insecurity:     Worry: Not on file     Inability: Not on file    Transportation needs:     Medical: Not on file     Non-medical: Not on file   Tobacco Use    Smoking status: Never Smoker    Smokeless tobacco: Never Used   Substance and Sexual Activity    Alcohol use: No    Drug use: No    Sexual activity: Yes     Partners: Male     Birth control/protection: Post-menopausal   Lifestyle    Physical activity:     Days per week:  Not on file     Minutes per session: Not on file    Stress: Not on file   Relationships    Social connections:     Talks on phone: Not on file     Gets together: Not on file     Attends Rastafarian service: Not on file     Active member of club or organization: Not on file     Attends meetings of clubs or organizations: Not on file     Relationship status: Not on file   Other Topics Concern    Not on file   Social History Narrative    Not on file       FAMILY HISTORY:  Family History   Problem Relation Age of Onset    Heart disease Mother     Stroke Mother     Diabetes Mother     Heart disease Father     Hypertension Father        ALLERGIES AND MEDICATIONS: updated and reviewed.  Review of patient's allergies indicates:  No Known Allergies  Current Outpatient Medications   Medication Sig Dispense Refill    baclofen (LIORESAL) 10 MG tablet Take 1 tablet (10 mg total) by mouth nightly as needed. 30 tablet 0    desloratadine (CLARINEX) 5 mg tablet Take 1 tablet (5 mg total) by mouth once daily. 30 tablet 11    meclizine (ANTIVERT) 25 mg tablet Take 1 tablet (25 mg total) by mouth 3 (three) times daily as needed for Dizziness. 30 tablet 0    ondansetron (ZOFRAN-ODT) 4 MG TbDL Take 1 tablet (4 mg total) by mouth every 8 (eight) hours as needed (nausea). 15 tablet 0     No current facility-administered medications for this visit.        ROS  Review of Systems   Constitutional: Negative for chills and fever.   HENT: Negative for hearing loss and sore throat.    Eyes: Negative for visual disturbance.   Respiratory: Negative for cough and shortness of breath.    Cardiovascular: Negative for chest pain, palpitations and leg swelling.   Gastrointestinal: Positive for abdominal pain (RUQ). Negative for constipation, diarrhea, nausea and vomiting.   Genitourinary: Positive for flank pain (right). Negative for dysuria, frequency and urgency.   Musculoskeletal: Negative for arthralgias, joint swelling and myalgias.  "  Skin: Negative for rash and wound.   Neurological: Positive for dizziness. Negative for headaches.   Psychiatric/Behavioral: Negative for agitation and confusion. The patient is not nervous/anxious.          OBJECTIVE     Physical Exam  Vitals:    07/17/19 1018   BP: (!) 140/80   Pulse: 90   Resp: 17   Temp: 97.8 °F (36.6 °C)    Body mass index is 23.89 kg/m².  Weight: 77.7 kg (171 lb 4.8 oz)   Height: 5' 11" (180.3 cm)     Physical Exam   Constitutional: She is oriented to person, place, and time. She appears well-developed and well-nourished. No distress.   HENT:   Head: Normocephalic and atraumatic.   Right Ear: External ear normal.   Left Ear: External ear normal.   Nose: Nose normal.   Mouth/Throat: Oropharynx is clear and moist.   Eyes: Conjunctivae and EOM are normal. Right eye exhibits no discharge. Left eye exhibits no discharge. No scleral icterus.   Neck: Normal range of motion. Neck supple. No JVD present. No tracheal deviation present.   Cardiovascular: Normal rate, regular rhythm and intact distal pulses. Exam reveals no gallop and no friction rub.   No murmur heard.  Pulmonary/Chest: Effort normal and breath sounds normal. No respiratory distress. She has no wheezes.   Abdominal: Soft. Bowel sounds are normal. She exhibits no distension and no mass. There is tenderness in the right upper quadrant. There is no rebound, no guarding and no CVA tenderness.   Musculoskeletal: Normal range of motion. She exhibits no edema or deformity.        Thoracic back: She exhibits tenderness (R flank TTP).   Neurological: She is alert and oriented to person, place, and time. She exhibits normal muscle tone. Coordination normal.   Negative Kevin Hallpike Maneuver   Skin: Skin is warm and dry. No rash noted. No erythema.   Psychiatric: She has a normal mood and affect. Her behavior is normal. Judgment and thought content normal.         Health Maintenance       Date Due Completion Date    TETANUS VACCINE 10/08/1972 --- "    Shingles Vaccine (1 of 2) 10/08/2004 ---    Colonoscopy 10/08/2004 ---    Mammogram 08/08/2019 8/8/2017    Influenza Vaccine 08/01/2019 11/7/2014    Lipid Panel 07/20/2022 7/20/2017            ASSESSMENT     63 y.o. female with     1. Acute right flank pain    2. Right upper quadrant abdominal pain    3. Elevated blood pressure reading        PLAN:     1. Acute right flank pain  - R/O rib fracture with xray; likely muscle strain vs cholelithiasis vs kidney pathology(less likely) so also eval with U/S  - Pt encouraged to apply ice packs 2-3 times daily at 10 minute intervals x 72 hours, then okay to change to heating compress with care not to burn her self; she  voiced understanding   - Pt to continue NSAIDs or Tylenol prn pain  - Rest and avoid heavy lifting of >5-10 lbs  - X-Ray Ribs 2 View Right; Future    2. Right upper quadrant abdominal pain  - R/O cholecystitis vs cholelithiasis  - US Abdomen Limited; Future    3. Elevated blood pressure reading  - BP elevated above goal of <140/90; possibly 2/2 pain  - Monitor        RTC in 2 weeks for repeat assessment of current treatment plan       Risa Cochran MD  07/17/2019 10:50 AM        No follow-ups on file.

## 2019-08-19 ENCOUNTER — HOSPITAL ENCOUNTER (OUTPATIENT)
Dept: RADIOLOGY | Facility: HOSPITAL | Age: 64
Discharge: HOME | End: 2019-08-19
Attending: PHYSICIAN ASSISTANT
Payer: COMMERCIAL

## 2019-08-19 VITALS — WEIGHT: 171 LBS | HEIGHT: 71 IN | BODY MASS INDEX: 23.94 KG/M2

## 2019-08-19 DIAGNOSIS — Z12.39 BREAST CANCER SCREENING: ICD-10-CM

## 2019-08-19 PROCEDURE — 77067 MAMMO DIGITAL SCREENING BILAT WITH TOMOSYNTHESIS_CAD: ICD-10-PCS | Mod: 26,,, | Performed by: RADIOLOGY

## 2019-08-19 PROCEDURE — 77067 SCR MAMMO BI INCL CAD: CPT | Mod: TC

## 2019-08-19 PROCEDURE — 77063 BREAST TOMOSYNTHESIS BI: CPT | Mod: 26,,, | Performed by: RADIOLOGY

## 2019-08-19 PROCEDURE — 77063 MAMMO DIGITAL SCREENING BILAT WITH TOMOSYNTHESIS_CAD: ICD-10-PCS | Mod: 26,,, | Performed by: RADIOLOGY

## 2019-08-19 PROCEDURE — 77067 SCR MAMMO BI INCL CAD: CPT | Mod: 26,,, | Performed by: RADIOLOGY

## 2019-12-29 ENCOUNTER — NURSE TRIAGE (OUTPATIENT)
Dept: ADMINISTRATIVE | Facility: CLINIC | Age: 64
End: 2019-12-29

## 2019-12-30 ENCOUNTER — HOSPITAL ENCOUNTER (OUTPATIENT)
Facility: HOSPITAL | Age: 64
Discharge: HOME | End: 2019-12-31
Attending: EMERGENCY MEDICINE | Admitting: EMERGENCY MEDICINE
Payer: COMMERCIAL

## 2019-12-30 DIAGNOSIS — I63.9 STROKE: ICD-10-CM

## 2019-12-30 DIAGNOSIS — R41.3 AMNESIA: ICD-10-CM

## 2019-12-30 PROBLEM — R41.82 ALTERED MENTAL STATUS: Status: ACTIVE | Noted: 2019-12-30

## 2019-12-30 LAB
ALBUMIN SERPL BCP-MCNC: 4.1 G/DL (ref 3.5–5.2)
ALP SERPL-CCNC: 84 U/L (ref 55–135)
ALT SERPL W/O P-5'-P-CCNC: 17 U/L (ref 10–44)
AMMONIA PLAS-SCNC: 37 UMOL/L (ref 10–50)
ANION GAP SERPL CALC-SCNC: 5 MMOL/L (ref 8–16)
AST SERPL-CCNC: 16 U/L (ref 10–40)
BACTERIA #/AREA URNS HPF: ABNORMAL /HPF
BASOPHILS # BLD AUTO: 0.03 K/UL (ref 0–0.2)
BASOPHILS NFR BLD: 0.6 % (ref 0–1.9)
BILIRUB SERPL-MCNC: 1.1 MG/DL (ref 0.1–1)
BILIRUB UR QL STRIP: NEGATIVE
BUN SERPL-MCNC: 16 MG/DL (ref 8–23)
CALCIUM SERPL-MCNC: 8.7 MG/DL (ref 8.7–10.5)
CHLORIDE SERPL-SCNC: 106 MMOL/L (ref 95–110)
CHOLEST SERPL-MCNC: 217 MG/DL (ref 120–199)
CHOLEST/HDLC SERPL: 3.2 {RATIO} (ref 2–5)
CLARITY UR: CLEAR
CO2 SERPL-SCNC: 26 MMOL/L (ref 23–29)
COLOR UR: YELLOW
CREAT SERPL-MCNC: 0.8 MG/DL (ref 0.5–1.4)
DIFFERENTIAL METHOD: NORMAL
EOSINOPHIL # BLD AUTO: 0.3 K/UL (ref 0–0.5)
EOSINOPHIL NFR BLD: 5 % (ref 0–8)
ERYTHROCYTE [DISTWIDTH] IN BLOOD BY AUTOMATED COUNT: 13 % (ref 11.5–14.5)
EST. GFR  (AFRICAN AMERICAN): >60 ML/MIN/1.73 M^2
EST. GFR  (NON AFRICAN AMERICAN): >60 ML/MIN/1.73 M^2
GLUCOSE SERPL-MCNC: 93 MG/DL (ref 70–110)
GLUCOSE UR QL STRIP: NEGATIVE
HCT VFR BLD AUTO: 41.8 % (ref 37–48.5)
HDLC SERPL-MCNC: 67 MG/DL (ref 40–75)
HDLC SERPL: 30.9 % (ref 20–50)
HGB BLD-MCNC: 13.7 G/DL (ref 12–16)
HGB UR QL STRIP: NEGATIVE
IMM GRANULOCYTES # BLD AUTO: 0.01 K/UL (ref 0–0.04)
IMM GRANULOCYTES NFR BLD AUTO: 0.2 % (ref 0–0.5)
INR PPP: 1 (ref 0.8–1.2)
KETONES UR QL STRIP: ABNORMAL
LDLC SERPL CALC-MCNC: 133.4 MG/DL (ref 63–159)
LEUKOCYTE ESTERASE UR QL STRIP: ABNORMAL
LYMPHOCYTES # BLD AUTO: 1.2 K/UL (ref 1–4.8)
LYMPHOCYTES NFR BLD: 22.6 % (ref 18–48)
MCH RBC QN AUTO: 29.7 PG (ref 27–31)
MCHC RBC AUTO-ENTMCNC: 32.8 G/DL (ref 32–36)
MCV RBC AUTO: 91 FL (ref 82–98)
MICROSCOPIC COMMENT: ABNORMAL
MONOCYTES # BLD AUTO: 0.5 K/UL (ref 0.3–1)
MONOCYTES NFR BLD: 9 % (ref 4–15)
NEUTROPHILS # BLD AUTO: 3.3 K/UL (ref 1.8–7.7)
NEUTROPHILS NFR BLD: 62.6 % (ref 38–73)
NITRITE UR QL STRIP: NEGATIVE
NONHDLC SERPL-MCNC: 150 MG/DL
NRBC BLD-RTO: 0 /100 WBC
PH UR STRIP: 5 [PH] (ref 5–8)
PLATELET # BLD AUTO: 245 K/UL (ref 150–350)
PMV BLD AUTO: 10.2 FL (ref 9.2–12.9)
POCT GLUCOSE: 75 MG/DL (ref 70–110)
POTASSIUM SERPL-SCNC: 4 MMOL/L (ref 3.5–5.1)
PROT SERPL-MCNC: 7.1 G/DL (ref 6–8.4)
PROT UR QL STRIP: NEGATIVE
PROTHROMBIN TIME: 10.4 SEC (ref 9–12.5)
RBC # BLD AUTO: 4.62 M/UL (ref 4–5.4)
RBC #/AREA URNS HPF: 1 /HPF (ref 0–4)
SODIUM SERPL-SCNC: 137 MMOL/L (ref 136–145)
SP GR UR STRIP: 1.02 (ref 1–1.03)
TRIGL SERPL-MCNC: 83 MG/DL (ref 30–150)
TSH SERPL DL<=0.005 MIU/L-ACNC: 2.79 UIU/ML (ref 0.4–4)
URN SPEC COLLECT METH UR: ABNORMAL
UROBILINOGEN UR STRIP-ACNC: NEGATIVE EU/DL
WBC # BLD AUTO: 5.21 K/UL (ref 3.9–12.7)
WBC #/AREA URNS HPF: 12 /HPF (ref 0–5)

## 2019-12-30 PROCEDURE — 82140 ASSAY OF AMMONIA: CPT

## 2019-12-30 PROCEDURE — 85025 COMPLETE CBC W/AUTO DIFF WBC: CPT

## 2019-12-30 PROCEDURE — 93005 ELECTROCARDIOGRAM TRACING: CPT

## 2019-12-30 PROCEDURE — 85610 PROTHROMBIN TIME: CPT

## 2019-12-30 PROCEDURE — 80061 LIPID PANEL: CPT

## 2019-12-30 PROCEDURE — 96374 THER/PROPH/DIAG INJ IV PUSH: CPT

## 2019-12-30 PROCEDURE — 82962 GLUCOSE BLOOD TEST: CPT

## 2019-12-30 PROCEDURE — 99219 PR INITIAL OBSERVATION CARE,LEVL II: ICD-10-PCS | Mod: ,,, | Performed by: PSYCHIATRY & NEUROLOGY

## 2019-12-30 PROCEDURE — G0378 HOSPITAL OBSERVATION PER HR: HCPCS

## 2019-12-30 PROCEDURE — 84443 ASSAY THYROID STIM HORMONE: CPT

## 2019-12-30 PROCEDURE — 25000003 PHARM REV CODE 250: Performed by: PHYSICIAN ASSISTANT

## 2019-12-30 PROCEDURE — 80053 COMPREHEN METABOLIC PANEL: CPT

## 2019-12-30 PROCEDURE — 87086 URINE CULTURE/COLONY COUNT: CPT

## 2019-12-30 PROCEDURE — 96375 TX/PRO/DX INJ NEW DRUG ADDON: CPT | Performed by: EMERGENCY MEDICINE

## 2019-12-30 PROCEDURE — A9585 GADOBUTROL INJECTION: HCPCS | Performed by: EMERGENCY MEDICINE

## 2019-12-30 PROCEDURE — 63600175 PHARM REV CODE 636 W HCPCS: Performed by: PHYSICIAN ASSISTANT

## 2019-12-30 PROCEDURE — 25500020 PHARM REV CODE 255: Performed by: EMERGENCY MEDICINE

## 2019-12-30 PROCEDURE — 99285 EMERGENCY DEPT VISIT HI MDM: CPT | Mod: 25

## 2019-12-30 PROCEDURE — 25000003 PHARM REV CODE 250: Performed by: EMERGENCY MEDICINE

## 2019-12-30 PROCEDURE — 93010 ELECTROCARDIOGRAM REPORT: CPT | Mod: ,,, | Performed by: INTERNAL MEDICINE

## 2019-12-30 PROCEDURE — 99219 PR INITIAL OBSERVATION CARE,LEVL II: CPT | Mod: ,,, | Performed by: PSYCHIATRY & NEUROLOGY

## 2019-12-30 PROCEDURE — 81000 URINALYSIS NONAUTO W/SCOPE: CPT

## 2019-12-30 PROCEDURE — 63600175 PHARM REV CODE 636 W HCPCS: Performed by: EMERGENCY MEDICINE

## 2019-12-30 PROCEDURE — 93010 EKG 12-LEAD: ICD-10-PCS | Mod: ,,, | Performed by: INTERNAL MEDICINE

## 2019-12-30 RX ORDER — GADOBUTROL 604.72 MG/ML
10 INJECTION INTRAVENOUS
Status: COMPLETED | OUTPATIENT
Start: 2019-12-30 | End: 2019-12-30

## 2019-12-30 RX ORDER — ASPIRIN 325 MG
325 TABLET ORAL
Status: COMPLETED | OUTPATIENT
Start: 2019-12-30 | End: 2019-12-30

## 2019-12-30 RX ORDER — LORAZEPAM 2 MG/ML
1 INJECTION INTRAMUSCULAR
Status: COMPLETED | OUTPATIENT
Start: 2019-12-30 | End: 2019-12-30

## 2019-12-30 RX ORDER — ONDANSETRON 2 MG/ML
4 INJECTION INTRAMUSCULAR; INTRAVENOUS EVERY 6 HOURS PRN
Status: DISCONTINUED | OUTPATIENT
Start: 2019-12-30 | End: 2019-12-31 | Stop reason: HOSPADM

## 2019-12-30 RX ORDER — TALC
6 POWDER (GRAM) TOPICAL NIGHTLY PRN
Status: DISCONTINUED | OUTPATIENT
Start: 2019-12-30 | End: 2019-12-31 | Stop reason: HOSPADM

## 2019-12-30 RX ORDER — NAPROXEN SODIUM 220 MG/1
81 TABLET, FILM COATED ORAL DAILY
Status: DISCONTINUED | OUTPATIENT
Start: 2019-12-31 | End: 2019-12-31 | Stop reason: HOSPADM

## 2019-12-30 RX ORDER — PRAVASTATIN SODIUM 40 MG/1
40 TABLET ORAL NIGHTLY
Status: DISCONTINUED | OUTPATIENT
Start: 2019-12-30 | End: 2019-12-31 | Stop reason: HOSPADM

## 2019-12-30 RX ORDER — ACETAMINOPHEN 500 MG
500 TABLET ORAL EVERY 6 HOURS PRN
Status: DISCONTINUED | OUTPATIENT
Start: 2019-12-30 | End: 2019-12-31 | Stop reason: HOSPADM

## 2019-12-30 RX ORDER — SODIUM CHLORIDE 0.9 % (FLUSH) 0.9 %
10 SYRINGE (ML) INJECTION
Status: DISCONTINUED | OUTPATIENT
Start: 2019-12-30 | End: 2019-12-31 | Stop reason: HOSPADM

## 2019-12-30 RX ADMIN — PRAVASTATIN SODIUM 40 MG: 40 TABLET ORAL at 09:12

## 2019-12-30 RX ADMIN — ASPIRIN 325 MG ORAL TABLET 325 MG: 325 PILL ORAL at 05:12

## 2019-12-30 RX ADMIN — ONDANSETRON HYDROCHLORIDE 4 MG: 2 SOLUTION INTRAMUSCULAR; INTRAVENOUS at 05:12

## 2019-12-30 RX ADMIN — LORAZEPAM 1 MG: 2 INJECTION INTRAMUSCULAR; INTRAVENOUS at 01:12

## 2019-12-30 RX ADMIN — GADOBUTROL 10 ML: 604.72 INJECTION INTRAVENOUS at 02:12

## 2019-12-30 NOTE — ED NOTES
"Pt to ED reporting "lost four hours yesterday."  reports pt was functioning normally. Patient reports remembers going to [employer] yesterday morning but does not remember leaving [employer] or getting home or eating lunch.  reports patient sat at table for two hours answering questions normally. Patient denies any s/s at this time. Hx migraines.   Pt AAOx3. Neuro intact, speech clear. Resp even, unlabored. NSR noted on cm. 100% on room air.   "

## 2019-12-30 NOTE — ED NOTES
Pt resting on stretcher comfortably with  at bedside. Updated on planned admit, verbalized understanding.

## 2019-12-30 NOTE — ED NOTES
MRI states unable to complete test due to pt being claustrophobic. MD notified and new order received.

## 2019-12-30 NOTE — TELEPHONE ENCOUNTER
B/P 182/87  after just drinking a coke. Pulse is 100. Patient temp is afebrile per the person calling Ms. Zhang. As well as 36h of short term memory loss. Denies any falls and her granddaughter has Influenza B. She states patient would not agree to the ER and There are no symptoms that she is giving me to triage that are current for the patient. The caller believes the patient has suffered as TIA, not sure when but refuses the ED and she is requesting a appointment.    Reason for Disposition   Requesting regular office appointment     B/P 182/87  after just drinking a coke.  Pulse is 100. Patient temp is afebrile per the personon calling Ms. Zhang. As well as 36h of short term memory loss. Denies any falls and her granddaughter has Influenza B.  She states patient would not agree to the ER and There are no symptoms that she is giving me to triage.    Additional Information   Negative: [1] Caller is not with the adult (patient) AND [2] reporting urgent symptoms   Negative: Lab result questions   Negative: Medication questions   Negative: Caller can't be reached by phone   Negative: Caller has already spoken to PCP or another triager   Negative: RN needs further essential information from caller in order to complete triage    Protocols used: INFORMATION ONLY CALL-A-

## 2019-12-30 NOTE — ED PROVIDER NOTES
"Encounter Date: 12/30/2019    SCRIBE #1 NOTE: I, Earlene Shaffer, am scribing for, and in the presence of,  Venus Adames MD. I have scribed the following portions of the note - Other sections scribed: HPI, ROS, PE, EKG.       History     Chief Complaint   Patient presents with    Altered Mental Status     c/o "confusion" on yesterday approx around 1030am that has since then subsided; denies numbness/tingling, dizziness/weakness, blurry vision, chest pain/discomfort, SOB     This is a 64 y.o. female who presents to the ED complaining of memory loss that occurred yesterday at 1030. She states that she can't remember anything that happened in a 4 hour time frame. She notes that she remembers going to [employer], but doesn't remember leaving. She reports that the next thing she remembers is eating lunch at 1400. She states that she is currently fine and is only experiencing a mild headache. She notes sick contact with her grandchild (Influenza and Pneumonia). She reports a recent fall that occurred on 2/7/2019. She denies a PMHx of Hypertension, Diabetes Mellitus, or thyroid disease. She reports a FMHx of Diabetes Mellitus and stroke (mother). She denies fever, rhinorrhea, hearing loss, cough, SOB, N/V/D, dysuria, and urinary frequency. No other associated symptoms.    Per the spouse, the patient was able to function yesterday during the 4 hours. He states that she kept checking her phone every 5 minutes in [employer], which is unusual for her. He reports that she was very forgetful and forgot that they were given a gift in [employer]. He notes that she sat at the table for 2 hours at home and adds that he kept telling her she should go lay down. He reports that she just kept saying she would, but never got up from the table.     The history is provided by the patient and the spouse. No  was used.     Review of patient's allergies indicates:  No Known Allergies  Past Medical History:   Diagnosis Date    " Closed left arm fracture 02/05/2019    humeral, surgically repaired, metal plate present    Encounter for blood transfusion     History of bleeding ulcers     Intermittent memory loss 12/29/2019    reports no memory of event during & after [employer] from on 12/29/30 from 10:30am - 2:30pm     Past Surgical History:   Procedure Laterality Date    FRACTURE SURGERY Left 02/09/2019    humeral fracture, plate present    HYSTERECTOMY      TONSILLECTOMY      VAGINAL DELIVERY       Family History   Problem Relation Age of Onset    Heart disease Mother     Stroke Mother     Diabetes Mother     Heart disease Father     Hypertension Father      Social History     Tobacco Use    Smoking status: Never Smoker    Smokeless tobacco: Never Used   Substance Use Topics    Alcohol use: No    Drug use: No     Review of Systems   Constitutional: Negative for fever.   HENT: Negative for hearing loss, rhinorrhea and sore throat.    Respiratory: Negative for cough and shortness of breath.    Cardiovascular: Negative for chest pain.   Gastrointestinal: Negative for diarrhea, nausea and vomiting.   Genitourinary: Negative for dysuria and frequency.   Musculoskeletal: Negative for back pain.   Skin: Negative for rash.   Neurological: Positive for headaches. Negative for weakness.   Hematological: Does not bruise/bleed easily.   Psychiatric/Behavioral: Positive for confusion (memory loss).       Physical Exam     Initial Vitals [12/30/19 0936]   BP Pulse Resp Temp SpO2   (!) 189/100 100 20 98.4 °F (36.9 °C) 99 %      MAP       --         Physical Exam    Nursing note and vitals reviewed.  Constitutional: She appears well-developed and well-nourished. She does not appear ill.   HENT:   Head: Normocephalic.   Eyes: Conjunctivae are normal. No scleral icterus.   Neck: Normal range of motion. Neck supple.   Cardiovascular: Normal heart sounds and intact distal pulses.   No murmur heard.  Pulmonary/Chest: Breath sounds normal. No  respiratory distress.   Abdominal: Soft. Bowel sounds are normal. She exhibits no distension.   Musculoskeletal: Normal range of motion. She exhibits no edema.   Neurological: She is alert and oriented to person, place, and time. She has normal strength.   Symmetric stregnth bilateral upper and lower extremities   Normal EOM  No sensory deficits  No aphasia or dysarthria  No cognitive deficits noted, following all instructions without difficulty  Patient able to recall the date, president, name, address, son's address.  Patient able to recall the events surrounding the amnesic event but does not recall a 4 hr window yesterday       Skin: Skin is warm and dry.   Psychiatric: She has a normal mood and affect. Her behavior is normal. Judgment and thought content normal.         ED Course   Procedures  Labs Reviewed   COMPREHENSIVE METABOLIC PANEL - Abnormal; Notable for the following components:       Result Value    Total Bilirubin 1.1 (*)     Anion Gap 5 (*)     All other components within normal limits   LIPID PANEL - Abnormal; Notable for the following components:    Cholesterol 217 (*)     All other components within normal limits   CBC W/ AUTO DIFFERENTIAL   PROTIME-INR   TSH   AMMONIA   URINALYSIS, REFLEX TO URINE CULTURE   POCT GLUCOSE, HAND-HELD DEVICE   POCT GLUCOSE     EKG Readings: (Independently Interpreted)   Rhythm: Normal Sinus Rhythm. Heart Rate: 72 bpm.   Normal ME interval.  Normal QRS complex.  No prolonged QT interval.  No ST elevation or depression.     ECG Results          ECG 12 lead (In process)  Result time 12/30/19 10:35:27    In process by Interface, Lab In [facility] (12/30/19 10:35:27)                 Narrative:    Test Reason : I63.9,    Vent. Rate : 072 BPM     Atrial Rate : 072 BPM     P-R Int : 146 ms          QRS Dur : 082 ms      QT Int : 400 ms       P-R-T Axes : 058 087 078 degrees     QTc Int : 438 ms    Normal sinus rhythm  Normal ECG  No previous ECGs available    Referred By:  [email or web address]   SELF           Confirmed By:                   In process by Interface, Lab In [facility] (12/30/19 10:30:26)                 Narrative:    Test Reason : I63.9,    Vent. Rate : 072 BPM     Atrial Rate : 072 BPM     P-R Int : 146 ms          QRS Dur : 082 ms      QT Int : 400 ms       P-R-T Axes : 058 087 078 degrees     QTc Int : 438 ms    Normal sinus rhythm  Normal ECG  No previous ECGs available    Referred By: [email or web address]   SELF           Confirmed By:                             Imaging Results          US Carotid Bilateral (Final result)  Result time 12/30/19 19:55:39    Final result by Anuj Suggs MD (12/30/19 19:55:39)                 Impression:      No evidence of a hemodynamically significant carotid bifurcation stenosis.      Electronically signed by: Anuj Suggs MD  Date:    12/30/2019  Time:    19:55             Narrative:    EXAMINATION:  US CAROTID BILATERAL    CLINICAL HISTORY:  Other amnesia    TECHNIQUE:  Grayscale and color Doppler ultrasound examination of the carotid and vertebral artery systems bilaterally.  Stenosis estimates are per the NASCET measurement criteria.    COMPARISON:  None.    FINDINGS:  Right:    Internal Carotid Artery (ICA):    Peak systolic velocity 81 cm/sec    End diastolic velocity 33 cm/sec    ICA/CCA peak systolic ratio: 1.0    ICA/CCA end diastolic ratio: 2.0    Plaque formation: Mild    Vertebral artery: Antegrade flow and normal waveform.    Left:    Internal Carotid Artery (ICA):    Peak systolic velocity 95 cm/sec    End diastolic velocity 30 cm/sec    ICA/CCA peak systolic ratio: 1.0    ICA/CCA end diastolic ratio: 1.2    Plaque formation: Mild    Vertebral artery: Antegrade flow and normal waveform.                               MRI Brain W WO Contrast (Final result)  Result time 12/30/19 14:31:59    Final result by Anuj Suggs MD (12/30/19 14:31:59)                 Impression:      1. No acute intracranial abnormalities, no findings to  suggest acute infarct or hemorrhage.  Please note there is motion artifact on the diffusion-weighted sequence.  2. Possible remote infarcts involving the left thalamus and left medial temporal region versus prominent perivascular spaces.  3. No abnormal parenchymal enhancement following contrast administration.      Electronically signed by: Anuj Suggs MD  Date:    12/30/2019  Time:    14:31             Narrative:    EXAMINATION:  MRI BRAIN W WO CONTRAST    CLINICAL HISTORY:  TIA, initial screening;    TECHNIQUE:  Multiplanar multisequence MR imaging of the brain was performed before and after the administration of 10 mL Gadavist intravenous contrast.    COMPARISON:  None    FINDINGS:  There is no intracranial mass, or acute hemorrhage. There is no restricted diffusion to suggest acute ischemia allowing for motion artifact..  There may be a remote infarct involving the anteromedial aspect of left thalamus.  There is remote infarct versus prominent perivascular space involving the inferior medial temporal region.  There is no hydrocephalus. There are no significant extra-axial or extracranial abnormalities.    The globes, orbits, pituitary gland, pineal gland and craniocervical junction are normal in configuration.  Following administration of contrast, there are no abnormal areas of enhancement.  The major vascular flow voids are patent.                               X-Ray Chest AP Portable (Final result)  Result time 12/30/19 10:41:52    Final result by Tung Grove MD (12/30/19 10:41:52)                 Impression:      1. Question emphysema.  No acute pulmonary processes.      Electronically signed by: Tung Grove MD  Date:    12/30/2019  Time:    10:41             Narrative:    EXAMINATION:  XR CHEST AP PORTABLE    CLINICAL HISTORY:  Stroke;    TECHNIQUE:  Single frontal view of the chest was performed.    COMPARISON:  Chest 08/25/2010    FINDINGS:  Heart size remains within normal limits.  There is no  tracheal abnormalities.    Lungs are well inflated.  Slight increased lucency predominant in the upper lobes most likely from emphysema.  Clinical correlation suggested.  No acute lobar consolidations or pneumothorax or pulmonary vascular congestion.  There is no cavitary lung masses.    There are cardiac monitoring leads over the chest.    There are postsurgical changes related to the left humerus associated with open reduction internal fixation.                                 Medical Decision Making:   Initial Assessment:   This is an evaluation of a 64 y.o. female who presents to the ED complaining of memory loss that occurred yesterday.  Clinical Tests:   Lab Tests: Ordered and Reviewed  Radiological Study: Ordered and Reviewed  Medical Tests: Ordered and Reviewed    64-year-old female with no significant past medical history presents with a transient state of anemia.  My differential includes TIA, stroke, transient amnesia of unknown origin.  I have discussed the case prematurely with Neurology who agrees that we can forego the CT and order the MRI.  Labs also ordered.  Overall labs are reassuring.  MRI does not show any obvious acute infarct.  I have discussed the case again with Neurology with this time would like carotid ultrasounds and arms overnight for neuro checks.  Patient while in the ED has been at her baseline the entire time.  Her blood pressure initially was elevated but has corrected on its own.  She has no recurrent symptoms or new symptoms. Patient was admitted to office with continued neuro checks and ultrasound is ordered  MAURICIO Adames MD  9:14 PM                  Scribe Attestation:   Scribe #1: I performed the above scribed service and the documentation accurately describes the services I performed. I attest to the accuracy of the note.                          Clinical Impression:       ICD-10-CM ICD-9-CM   1. Stroke I63.9 434.91   2. Amnesia R41.3 780.93            Scribe attestation: I,  Venus Adames, personally performed the services described in this documentation. All medical record entries made by the scribe were at my direction and in my presence.  I have reviewed the chart and agree that the record reflects my personal performance and is accurate and complete.               Venus Adames MD  12/30/19 2115

## 2019-12-30 NOTE — TELEPHONE ENCOUNTER
Call placed to Pt's spouse-Lokesh, and informed that we was trying to call his wife to inform her that she needed to go to the ED for evaluation and treatment secondary to message from Triage Nurse. He states that he just left his wife, by her son. That he will turn around and go back there, and request that I give him a return call in 15 minutes. He asked if she needed to go to the ED right now. I informed him that yes she does.

## 2019-12-30 NOTE — CONSULTS
"[facility]  Neurology  Consult Note    Patient Name: Maryann Grant  MRN: [id number]  Admission Date: 12/30/2019  Hospital Length of Stay: 0 days  Code Status: No Order   Attending Provider: Venus Adames MD   Consulting Provider: De Leiva MD  Primary Care Physician: Risa Cochran MD  Principal Problem:<principal problem not specified>    Consults  Subjective:     Chief Complaint: "I can't remember four hours of my life"     HPI: 63 y/o female with medical Hx as listed below states that yesterday while at [employer] she seems to have lost memory of any event for four hours. She recall getting to [employer] and sitting on a bench the being at home watching TV. Her  reports that Ms. Grant seemed to have functioned almost normally during the episode although he noticed that she was "slow at times". No headaches, visual or speech disturbances, vertigo, numbness or weakness of limbs. No previous episodes. No aggravating or alleviating factors.    Past Medical History:   Diagnosis Date    History of bleeding ulcers        Past Surgical History:   Procedure Laterality Date    HYSTERECTOMY      TONSILLECTOMY      VAGINAL DELIVERY         Review of patient's allergies indicates:  No Known Allergies    Current Neurological Medications:     No current facility-administered medications on file prior to encounter.      Current Outpatient Medications on File Prior to Encounter   Medication Sig    desloratadine (CLARINEX) 5 mg tablet Take 1 tablet (5 mg total) by mouth once daily.    meclizine (ANTIVERT) 25 mg tablet Take 1 tablet (25 mg total) by mouth 3 (three) times daily as needed for Dizziness.    baclofen (LIORESAL) 10 MG tablet Take 1 tablet (10 mg total) by mouth nightly as needed.    ondansetron (ZOFRAN-ODT) 4 MG TbDL Take 1 tablet (4 mg total) by mouth every 8 (eight) hours as needed (nausea).      Family History     Problem Relation (Age of Onset)    Diabetes Mother    Heart disease " Mother, Father    Hypertension Father    Stroke Mother        Tobacco Use    Smoking status: Never Smoker    Smokeless tobacco: Never Used   Substance and Sexual Activity    Alcohol use: No    Drug use: No    Sexual activity: Yes     Partners: Male     Birth control/protection: Post-menopausal     Review of Systems   Constitutional: Negative for fever.   HENT: Negative for trouble swallowing.    Eyes: Negative for photophobia.   Respiratory: Negative for shortness of breath.    Cardiovascular: Negative for leg swelling.   Gastrointestinal: Negative for abdominal pain.   Genitourinary: Negative for dysuria.   Musculoskeletal: Negative for back pain.   Neurological: Negative for headaches.   Psychiatric/Behavioral: Negative for behavioral problems.     Objective:     Vital Signs (Most Recent):  Temp: 98.4 °F (36.9 °C) (12/30/19 0936)  Pulse: 84 (12/30/19 1239)  Resp: 14 (12/30/19 1201)  BP: (!) 151/67 (12/30/19 1239)  SpO2: 100 % (12/30/19 1239) Vital Signs (24h Range):  Temp:  [98.4 °F (36.9 °C)] 98.4 °F (36.9 °C)  Pulse:  [] 84  Resp:  [12-20] 14  SpO2:  [98 %-100 %] 100 %  BP: (137-189)/() 151/67     Weight: 79.4 kg (175 lb)  Body mass index is 24.41 kg/m².    Physical Exam   Constitutional: She is oriented to person, place, and time. No distress.   HENT:   Head: Normocephalic.   Eyes: Right eye exhibits no discharge. Left eye exhibits no discharge.   Neck: Normal range of motion.   Cardiovascular: Normal rate and regular rhythm.   Pulmonary/Chest: Effort normal and breath sounds normal.   Abdominal: Soft. Bowel sounds are normal.   Musculoskeletal: She exhibits no edema, tenderness or deformity.   Neurological: She is oriented to person, place, and time. She has normal strength. She has a normal Finger-Nose-Finger Test.   Reflex Scores:       Tricep reflexes are 2+ on the right side and 2+ on the left side.       Bicep reflexes are 2+ on the right side and 2+ on the left side.        Brachioradialis reflexes are 2+ on the right side and 2+ on the left side.       Patellar reflexes are 2+ on the right side and 2+ on the left side.       Achilles reflexes are 2+ on the right side.  Skin: She is not diaphoretic.   Psychiatric: Her speech is normal.       NEUROLOGICAL EXAMINATION:     MENTAL STATUS   Oriented to person, place, and time.   Speech: speech is normal   Level of consciousness: alert       Good immediate recall and at five miuntes     CRANIAL NERVES     CN III, IV, VI   Right pupil: Size: 3 mm. Shape: regular.   Left pupil: Size: 3 mm. Shape: regular.   Nystagmus: none   Ophthalmoparesis: none  Conjugate gaze: present    CN V   Right facial sensation deficit: none  Left facial sensation deficit: none    CN VII   Right facial weakness: none  Left facial weakness: none    CN XI   Right trapezius strength: normal  Left trapezius strength: normal    CN XII   Tongue deviation: none    MOTOR EXAM     Strength   Strength 5/5 throughout.     REFLEXES     Reflexes   Right brachioradialis: 2+  Left brachioradialis: 2+  Right biceps: 2+  Left biceps: 2+  Right triceps: 2+  Left triceps: 2+  Right patellar: 2+  Left patellar: 2+  Right achilles: 2+    SENSORY EXAM   Right arm light touch: normal  Right leg light touch: normal  Left leg light touch: normal    GAIT AND COORDINATION      Coordination   Finger to nose coordination: normal    Tremor   Resting tremor: absent      Significant Labs:   CBC:   Recent Labs   Lab 12/30/19  1018   WBC 5.21   HGB 13.7   HCT 41.8        CMP:   Recent Labs   Lab 12/30/19  1018   GLU 93      K 4.0      CO2 26   BUN 16   CREATININE 0.8   CALCIUM 8.7   PROT 7.1   ALBUMIN 4.1   BILITOT 1.1*   ALKPHOS 84   AST 16   ALT 17   ANIONGAP 5*   EGFRNONAA >60     LIPIDS/LFTS:  Recent Labs   Lab 07/20/17  1108 12/30/19  1018   Cholesterol 223 H 217 H   Triglycerides 71 83   HDL 72 67   LDL Cholesterol 136.8 133.4   Non-HDL Cholesterol 151 150         Assessment  and Plan:     65 y/o female with transient amnesia    1. Amnesia: this event suggests transient global amnesia.    -MRI of brain to R/O stroke.   -ASA 81 mg daily.   -Low dose tatin   -Carotid US.    VTE Risk Mitigation (From admission, onward)    None          Thank you for your consult. I will follow-up with patient. Please contact us if you have any additional questions.    De Leiva MD  Neurology  [facility]

## 2019-12-30 NOTE — TELEPHONE ENCOUNTER
Return call to Pt's , and he was next to his wife. I spoke with the Pt and informed her that we received a message from the Triage Nurse about her having a possible TIA and that we wanted her to go to the ED for evaluation. She stated that she had some memory loss over the weekend, but her daughter in law was a nurse and thought that she would be okay. Informed her that the Provider would like her to go to the ED. She acknowledged understanding, and stated that she would go there now.

## 2019-12-31 VITALS
DIASTOLIC BLOOD PRESSURE: 62 MMHG | HEIGHT: 71 IN | WEIGHT: 176 LBS | TEMPERATURE: 98 F | HEART RATE: 80 BPM | RESPIRATION RATE: 18 BRPM | OXYGEN SATURATION: 98 % | BODY MASS INDEX: 24.64 KG/M2 | SYSTOLIC BLOOD PRESSURE: 136 MMHG

## 2019-12-31 LAB
ANION GAP SERPL CALC-SCNC: 9 MMOL/L (ref 8–16)
AORTIC ROOT ANNULUS: 3.04 CM
AORTIC VALVE CUSP SEPERATION: 2.28 CM
ASCENDING AORTA: 2.9 CM
AV INDEX (PROSTH): 1.07
AV MEAN GRADIENT: 4 MMHG
AV PEAK GRADIENT: 7 MMHG
AV VALVE AREA: 3.35 CM2
AV VELOCITY RATIO: 1.04
BSA FOR ECHO PROCEDURE: 2 M2
BUN SERPL-MCNC: 19 MG/DL (ref 8–23)
CALCIUM SERPL-MCNC: 9.2 MG/DL (ref 8.7–10.5)
CHLORIDE SERPL-SCNC: 106 MMOL/L (ref 95–110)
CO2 SERPL-SCNC: 24 MMOL/L (ref 23–29)
CREAT SERPL-MCNC: 0.8 MG/DL (ref 0.5–1.4)
CV ECHO LV RWT: 0.44 CM
DOP CALC AO PEAK VEL: 1.28 M/S
DOP CALC AO VTI: 25.96 CM
DOP CALC LVOT AREA: 3.1 CM2
DOP CALC LVOT DIAMETER: 2 CM
DOP CALC LVOT PEAK VEL: 1.33 M/S
DOP CALC LVOT STROKE VOLUME: 86.95 CM3
DOP CALCLVOT PEAK VEL VTI: 27.69 CM
E WAVE DECELERATION TIME: 230.15 MSEC
E/A RATIO: 0.83
E/E' RATIO: 9.76 M/S
ECHO LV POSTERIOR WALL: 0.94 CM (ref 0.6–1.1)
EST. GFR  (AFRICAN AMERICAN): >60 ML/MIN/1.73 M^2
EST. GFR  (NON AFRICAN AMERICAN): >60 ML/MIN/1.73 M^2
FRACTIONAL SHORTENING: 33 % (ref 28–44)
GLUCOSE SERPL-MCNC: 71 MG/DL (ref 70–110)
INTERVENTRICULAR SEPTUM: 0.7 CM (ref 0.6–1.1)
IVRT: 0.09 MSEC
LA MAJOR: 4.83 CM
LA MINOR: 4.85 CM
LA WIDTH: 4.39 CM
LEFT ATRIUM SIZE: 3.23 CM
LEFT ATRIUM VOLUME INDEX: 29.2 ML/M2
LEFT ATRIUM VOLUME: 58.34 CM3
LEFT INTERNAL DIMENSION IN SYSTOLE: 2.88 CM (ref 2.1–4)
LEFT VENTRICLE DIASTOLIC VOLUME INDEX: 40.84 ML/M2
LEFT VENTRICLE DIASTOLIC VOLUME: 81.58 ML
LEFT VENTRICLE MASS INDEX: 54 G/M2
LEFT VENTRICLE SYSTOLIC VOLUME INDEX: 15.8 ML/M2
LEFT VENTRICLE SYSTOLIC VOLUME: 31.61 ML
LEFT VENTRICULAR INTERNAL DIMENSION IN DIASTOLE: 4.27 CM (ref 3.5–6)
LEFT VENTRICULAR MASS: 107.57 G
LV LATERAL E/E' RATIO: 9.22 M/S
LV SEPTAL E/E' RATIO: 10.38 M/S
MV PEAK A VEL: 1 M/S
MV PEAK E VEL: 0.83 M/S
PISA TR MAX VEL: 1.9 M/S
POTASSIUM SERPL-SCNC: 3.7 MMOL/L (ref 3.5–5.1)
PULM VEIN S/D RATIO: 1.44
PV PEAK D VEL: 0.45 M/S
PV PEAK S VEL: 0.65 M/S
PV PEAK VELOCITY: 1 CM/S
RA MAJOR: 4.61 CM
RA PRESSURE: 3 MMHG
RA WIDTH: 3.04 CM
RIGHT VENTRICULAR END-DIASTOLIC DIMENSION: 2.26 CM
RV TISSUE DOPPLER FREE WALL SYSTOLIC VELOCITY 1 (APICAL 4 CHAMBER VIEW): 16.2 CM/S
SINUS: 2.85 CM
SODIUM SERPL-SCNC: 139 MMOL/L (ref 136–145)
STJ: 2.83 CM
TDI LATERAL: 0.09 M/S
TDI SEPTAL: 0.08 M/S
TDI: 0.09 M/S
TR MAX PG: 14 MMHG
TRICUSPID ANNULAR PLANE SYSTOLIC EXCURSION: 2.4 CM
TV REST PULMONARY ARTERY PRESSURE: 17 MMHG

## 2019-12-31 PROCEDURE — G0378 HOSPITAL OBSERVATION PER HR: HCPCS

## 2019-12-31 PROCEDURE — 99225 PR SUBSEQUENT OBSERVATION CARE,LEVEL II: CPT | Mod: ,,, | Performed by: PSYCHIATRY & NEUROLOGY

## 2019-12-31 PROCEDURE — 99225 PR SUBSEQUENT OBSERVATION CARE,LEVEL II: ICD-10-PCS | Mod: ,,, | Performed by: PSYCHIATRY & NEUROLOGY

## 2019-12-31 PROCEDURE — 25000003 PHARM REV CODE 250: Performed by: PHYSICIAN ASSISTANT

## 2019-12-31 PROCEDURE — 36415 COLL VENOUS BLD VENIPUNCTURE: CPT

## 2019-12-31 PROCEDURE — 80048 BASIC METABOLIC PNL TOTAL CA: CPT

## 2019-12-31 RX ORDER — PRAVASTATIN SODIUM 40 MG/1
40 TABLET ORAL NIGHTLY
Qty: 90 TABLET | Refills: 3 | Status: SHIPPED | OUTPATIENT
Start: 2019-12-31 | End: 2020-01-03 | Stop reason: SINTOL

## 2019-12-31 RX ORDER — NAPROXEN SODIUM 220 MG/1
81 TABLET, FILM COATED ORAL DAILY
Qty: 30 TABLET | Refills: 3 | Status: SHIPPED | OUTPATIENT
Start: 2019-12-31 | End: 2022-10-28

## 2019-12-31 RX ADMIN — ASPIRIN 81 MG 81 MG: 81 TABLET ORAL at 10:12

## 2019-12-31 NOTE — PLAN OF CARE
12/31/19 0932   Discharge Assessment   Assessment Type Discharge Planning Assessment   Assessment information obtained from? Medical Record   Prior to hospitilization cognitive status: Alert/Oriented   Prior to hospitalization functional status: Independent   Current cognitive status: Alert/Oriented   Current Functional Status: Independent   Facility Arrived From: home   Lives With spouse   Able to Return to Prior Arrangements yes   Is patient able to care for self after discharge? Yes   Who are your caregiver(s) and their phone number(s)? [facility] [phone]   Patient's perception of discharge disposition home or selfcare   Readmission Within the Last 30 Days no previous admission in last 30 days   Patient currently being followed by outpatient case management? No   Patient currently receives any other outside agency services? No   Equipment Currently Used at Home none   Do you have any problems affording any of your prescribed medications? No   Is the patient taking medications as prescribed? yes   Does the patient have transportation home? Yes   Transportation Anticipated family or friend will provide   Does the patient receive services at the [facility]? No   Discharge Plan A Home with family  (with follow up appointment)   DME Needed Upon Discharge  none   Patient/Family in Agreement with Plan yes     [facility] [address] [address]  [address] [address]  Phone: [phone] Fax: [phone]

## 2019-12-31 NOTE — HPI
Maryann Grant  64 y.o. female presents to the hosptial with a chief complaint of altered mental status. She reports for 4 hours 12/29 she does not remember anything that occurred. She was able to go to [employer] and eat. However, the next thing she remembers is watching a football game. These symptoms have never occurred before. She currently feels back to her baseline. Per family at bedside she is at baseline. During the event, the family at bedside, who is a nurse, reports she had a no neurologic deficits, but was disoriented to all short term memory events. She denies fever, chest pain, SOB, N/V, abdominal pain, dysuria, leg swelling, syncope, headache, numbness/weakness.    In the ED, MRI without acute abnormality, TSH 2.7, electrolytes within normal limits, afebrile without leukocytosis.

## 2019-12-31 NOTE — H&P
"[facility] Medicine  History & Physical    Patient Name: Maryann Grant  MRN: [id number]  Admission Date: 12/30/2019  Attending Physician: Kiara Park MD   Primary Care Provider: Risa Cochran MD         Patient information was obtained from patient, past medical records and ER records.     Subjective:     Principal Problem:Altered mental status    Chief Complaint:   Chief Complaint   Patient presents with    Altered Mental Status     c/o "confusion" on yesterday approx around 1030am that has since then subsided; denies numbness/tingling, dizziness/weakness, blurry vision, chest pain/discomfort, SOB        HPI: Maryann Grant  64 y.o. female presents to the hosptial with a chief complaint of altered mental status. She reports for 4 hours 12/29 she does not remember anything that occurred. She was able to go to [employer] and eat. However, the next thing she remembers is watching a football game. These symptoms have never occurred before. She currently feels back to her baseline. Per family at bedside she is at baseline. During the event, the family at bedside, who is a nurse, reports she had a no neurologic deficits, but was disoriented to all short term memory events. She denies fever, chest pain, SOB, N/V, abdominal pain, dysuria, leg swelling, syncope, headache, numbness/weakness.    In the ED, MRI without acute abnormality, TSH 2.7, electrolytes within normal limits, afebrile without leukocytosis.     Past Medical History:   Diagnosis Date    History of bleeding ulcers        Past Surgical History:   Procedure Laterality Date    HYSTERECTOMY      TONSILLECTOMY      VAGINAL DELIVERY         Review of patient's allergies indicates:  No Known Allergies    No current facility-administered medications on file prior to encounter.      Current Outpatient Medications on File Prior to Encounter   Medication Sig    desloratadine (CLARINEX) 5 mg tablet Take 1 tablet (5 mg total) by mouth " once daily.    meclizine (ANTIVERT) 25 mg tablet Take 1 tablet (25 mg total) by mouth 3 (three) times daily as needed for Dizziness.    baclofen (LIORESAL) 10 MG tablet Take 1 tablet (10 mg total) by mouth nightly as needed.    ondansetron (ZOFRAN-ODT) 4 MG TbDL Take 1 tablet (4 mg total) by mouth every 8 (eight) hours as needed (nausea).     Family History     Problem Relation (Age of Onset)    Diabetes Mother    Heart disease Mother, Father    Hypertension Father    Stroke Mother        Tobacco Use    Smoking status: Never Smoker    Smokeless tobacco: Never Used   Substance and Sexual Activity    Alcohol use: No    Drug use: No    Sexual activity: Yes     Partners: Male     Birth control/protection: Post-menopausal     Review of Systems   Constitutional: Negative for chills and fever.   HENT: Negative for nosebleeds and tinnitus.    Eyes: Negative for photophobia and visual disturbance.   Respiratory: Negative for shortness of breath and wheezing.    Cardiovascular: Negative for chest pain, palpitations and leg swelling.   Gastrointestinal: Negative for abdominal distention, nausea and vomiting.   Genitourinary: Negative for dysuria, flank pain and hematuria.   Musculoskeletal: Negative for gait problem and joint swelling.   Skin: Negative for rash and wound.   Neurological: Negative for seizures and syncope.   Psychiatric/Behavioral: Positive for confusion and sleep disturbance.     Objective:     Vital Signs (Most Recent):  Temp: 98.4 °F (36.9 °C) (12/30/19 0936)  Pulse: 76 (12/30/19 1731)  Resp: 16 (12/30/19 1731)  BP: 133/62 (12/30/19 1731)  SpO2: 95 % (12/30/19 1731) Vital Signs (24h Range):  Temp:  [98.4 °F (36.9 °C)] 98.4 °F (36.9 °C)  Pulse:  [] 76  Resp:  [12-20] 16  SpO2:  [95 %-100 %] 95 %  BP: (133-189)/() 133/62     Weight: 79.4 kg (175 lb)  Body mass index is 24.41 kg/m².    Physical Exam   Constitutional: She is oriented to person, place, and time. She appears well-developed and  well-nourished. No distress.   HENT:   Head: Normocephalic and atraumatic.   Eyes: Conjunctivae and EOM are normal. Right eye exhibits no discharge. Left eye exhibits no discharge.   Neck: Normal range of motion. No thyromegaly present.   Cardiovascular: Normal rate and regular rhythm.   No murmur heard.  Pulmonary/Chest: Effort normal and breath sounds normal. No respiratory distress.   Abdominal: Soft. Bowel sounds are normal. She exhibits no distension and no mass. There is no tenderness.   Musculoskeletal: She exhibits no edema or deformity.   Neurological: She is alert and oriented to person, place, and time. No sensory deficit. She exhibits normal muscle tone.   5/5 strength BUE and BLE. No past pointing on finger to nose. Heel to shin intact   Skin: Skin is warm and dry.   Psychiatric: She has a normal mood and affect. Her behavior is normal.   Serial 7's intact. Aware of current events   Nursing note and vitals reviewed.        CRANIAL NERVES     CN III, IV, VI   Extraocular motions are normal.        Significant Labs:   CBC:   Recent Labs   Lab 12/30/19  1018   WBC 5.21   HGB 13.7   HCT 41.8        CMP:   Recent Labs   Lab 12/30/19  1018      K 4.0      CO2 26   GLU 93   BUN 16   CREATININE 0.8   CALCIUM 8.7   PROT 7.1   ALBUMIN 4.1   BILITOT 1.1*   ALKPHOS 84   AST 16   ALT 17   ANIONGAP 5*   EGFRNONAA >60     Lipid Panel:   Recent Labs   Lab 12/30/19  1018   CHOL 217*   HDL 67   LDLCALC 133.4   TRIG 83   CHOLHDL 30.9     TSH:   Recent Labs   Lab 12/30/19  1018   TSH 2.788       Significant Imaging:   Imaging Results          MRI Brain W WO Contrast (Final result)  Result time 12/30/19 14:31:59    Final result by Anuj Suggs MD (12/30/19 14:31:59)                 Impression:      1. No acute intracranial abnormalities, no findings to suggest acute infarct or hemorrhage.  Please note there is motion artifact on the diffusion-weighted sequence.  2. Possible remote infarcts involving  the left thalamus and left medial temporal region versus prominent perivascular spaces.  3. No abnormal parenchymal enhancement following contrast administration.      Electronically signed by: Anuj Suggs MD  Date:    12/30/2019  Time:    14:31             Narrative:    EXAMINATION:  MRI BRAIN W WO CONTRAST    CLINICAL HISTORY:  TIA, initial screening;    TECHNIQUE:  Multiplanar multisequence MR imaging of the brain was performed before and after the administration of 10 mL Gadavist intravenous contrast.    COMPARISON:  None    FINDINGS:  There is no intracranial mass, or acute hemorrhage. There is no restricted diffusion to suggest acute ischemia allowing for motion artifact..  There may be a remote infarct involving the anteromedial aspect of left thalamus.  There is remote infarct versus prominent perivascular space involving the inferior medial temporal region.  There is no hydrocephalus. There are no significant extra-axial or extracranial abnormalities.    The globes, orbits, pituitary gland, pineal gland and craniocervical junction are normal in configuration.  Following administration of contrast, there are no abnormal areas of enhancement.  The major vascular flow voids are patent.                               X-Ray Chest AP Portable (Final result)  Result time 12/30/19 10:41:52    Final result by Tung Grove MD (12/30/19 10:41:52)                 Impression:      1. Question emphysema.  No acute pulmonary processes.      Electronically signed by: Tung Grove MD  Date:    12/30/2019  Time:    10:41             Narrative:    EXAMINATION:  XR CHEST AP PORTABLE    CLINICAL HISTORY:  Stroke;    TECHNIQUE:  Single frontal view of the chest was performed.    COMPARISON:  Chest 08/25/2010    FINDINGS:  Heart size remains within normal limits.  There is no tracheal abnormalities.    Lungs are well inflated.  Slight increased lucency predominant in the upper lobes most likely from emphysema.  Clinical  correlation suggested.  No acute lobar consolidations or pneumothorax or pulmonary vascular congestion.  There is no cavitary lung masses.    There are cardiac monitoring leads over the chest.    There are postsurgical changes related to the left humerus associated with open reduction internal fixation.                                  Assessment/Plan:     * Altered mental status  Etiology is yet to be determined.  MRI-head was negative for acute abnormality.  Chest x-ray without acute process Patient is without fevers and meningismus. serum glucose was 93 mg/dL; electrolytes within normal limits; there is no evidence of uremia.  There is no evidence of thyroidal disease; skin is intact; and there are no rashes.  Patient is hemodynamically-stable and there has no been recent medication changes.  Clinical suspicion of CVA or subclinical seizures are low.  Will plan to perform neurological testing every 4 hours with frequent re-orientation.  Will also minimize medications and place fall precautions.    UA pending/Ammonia pending  Carotid US/Echo pending    Hyperlipidemia  Attempted to start Atorvastatin, though patient requested to only start low intensity statin first. Will start Pravastatin/Aspirin        VTE Risk Mitigation (From admission, onward)         Ordered     IP VTE LOW RISK PATIENT  Once      12/30/19 1745     Place sequential compression device  Until discontinued      12/30/19 1745              VTE: SCD  Code: Full  Diet: cardiac  Dispo: pending echo/carotid US     Patrick Tong PA-C  Department of Hospital Medicine   [facility]

## 2019-12-31 NOTE — ASSESSMENT & PLAN NOTE
Attempted to start Atorvastatin, though patient requested to only start low intensity statin first. Will start Pravastatin/Aspirin

## 2019-12-31 NOTE — PROGRESS NOTES
"[facility]  Neurology  Progress Note    Patient Name: Maryann Grant  MRN: [id number]  Admission Date: 12/30/2019  Hospital Length of Stay: 0 days  Code Status: Full Code   Attending Provider: Erin Gardiner MD  Primary Care Physician: Risa Cochran MD   Principal Problem:Altered mental status    Subjective:     Interval History: 63 y/o female with medical Hx as listed below states that yesterday while at [employer] she seems to have lost memory of any event for four hours. She recall getting to [employer] and sitting on a bench the being at home watching TV. Her  reports that Ms. Grant seemed to have functioned almost normally during the episode although he noticed that she was "slow at times". No headaches, visual or speech disturbances, vertigo, numbness or weakness of limbs. No previous episodes. No aggravating or alleviating factors.    -12/31/19: No acute issues overnight.    Current Neurological Medications:    Current Facility-Administered Medications   Medication Dose Route Frequency Provider Last Rate Last Dose    acetaminophen tablet 500 mg  500 mg Oral Q6H PRN Patrick Showers, PA-C        aspirin chewable tablet 81 mg  81 mg Oral Daily Patrick Showers, PA-C   81 mg at 12/31/19 1035    melatonin tablet 6 mg  6 mg Oral Nightly PRN Aptrick Showers, PA-C        ondansetron injection 4 mg  4 mg Intravenous Q6H PRN Patrick Showers, PA-C   4 mg at 12/30/19 1753    pravastatin tablet 40 mg  40 mg Oral QHS Patrick Showers, PA-C   40 mg at 12/30/19 2135    sodium chloride 0.9% flush 10 mL  10 mL Intravenous PRN Venus Adames MD           Review of Systems   Constitutional: Negative for fever.   HENT: Negative for trouble swallowing.    Eyes: Negative for photophobia.   Respiratory: Negative for shortness of breath.    Cardiovascular: Negative for leg swelling.   Gastrointestinal: Negative for abdominal pain.   Genitourinary: Negative for dysuria.   Musculoskeletal: Negative for back " pain.   Neurological: Negative for headaches.   Psychiatric/Behavioral: Negative for behavioral problems.     Objective:     Vital Signs (Most Recent):  Temp: 97.8 °F (36.6 °C) (12/31/19 1102)  Pulse: 80 (12/31/19 1102)  Resp: 18 (12/31/19 1102)  BP: 136/62 (12/31/19 1102)  SpO2: 98 % (12/31/19 1102) Vital Signs (24h Range):  Temp:  [97.5 °F (36.4 °C)-97.9 °F (36.6 °C)] 97.8 °F (36.6 °C)  Pulse:  [76-84] 80  Resp:  [16-18] 18  SpO2:  [95 %-100 %] 98 %  BP: (113-151)/(61-69) 136/62     Weight: 79.8 kg (176 lb)  Body mass index is 24.55 kg/m².    Physical Exam  Constitutional: She is oriented to person, place, and time. No distress.   HENT:   Head: Normocephalic.   Eyes: Right eye exhibits no discharge. Left eye exhibits no discharge.   Neck: Normal range of motion.   Cardiovascular: Normal rate and regular rhythm.   Pulmonary/Chest: Effort normal and breath sounds normal.   Abdominal: Soft. Bowel sounds are normal.   Musculoskeletal: She exhibits no edema, tenderness or deformity.   Neurological: She is oriented to person, place, and time. She has normal strength. She has a normal Finger-Nose-Finger Test.   Skin: She is not diaphoretic.   Psychiatric: Her speech is normal.         NEUROLOGICAL EXAMINATION:      MENTAL STATUS   Oriented to person, place, and time.   Speech: speech is normal   Level of consciousness: alert       Good immediate recall and at five miuntes      CRANIAL NERVES      CN III, IV, VI   Right pupil: Size: 3 mm. Shape: regular.   Left pupil: Size: 3 mm. Shape: regular.   Nystagmus: none   Ophthalmoparesis: none  Conjugate gaze: present     CN V   Right facial sensation deficit: none  Left facial sensation deficit: none     CN VII   Right facial weakness: none  Left facial weakness: none     CN XI   Right trapezius strength: normal  Left trapezius strength: normal     CN XII   Tongue deviation: none     MOTOR EXAM      Strength   Strength 5/5 throughout.         SENSORY EXAM   Right arm light  touch: normal  Right leg light touch: normal  Left leg light touch: normal     GAIT AND COORDINATION      Coordination   Finger to nose coordination: normal     Tremor   Resting tremor: absent       Significant Labs:   CBC:   Recent Labs   Lab 12/30/19  1018   WBC 5.21   HGB 13.7   HCT 41.8        CMP:   Recent Labs   Lab 12/30/19  1018 12/31/19  0426   GLU 93 71    139   K 4.0 3.7    106   CO2 26 24   BUN 16 19   CREATININE 0.8 0.8   CALCIUM 8.7 9.2   PROT 7.1  --    ALBUMIN 4.1  --    BILITOT 1.1*  --    ALKPHOS 84  --    AST 16  --    ALT 17  --    ANIONGAP 5* 9   EGFRNONAA >60 >60     Carotid US      Assessment and Plan:     65 y/o female with transient amnesia     1. Amnesia: this event suggests transient global amnesia.            -MRI of brain shows no acute abnormalities           -ASA 81 mg daily.           -Low dose tatin           -Carotid US with no significant stenosis.    -OK to D/C home. Follow up with neurology.    Active Diagnoses:    Diagnosis Date Noted POA    PRINCIPAL PROBLEM:  Altered mental status [R41.82] 12/30/2019 Yes    Hyperlipidemia [E78.5] 08/01/2017 Yes      Problems Resolved During this Admission:       VTE Risk Mitigation (From admission, onward)         Ordered     IP VTE LOW RISK PATIENT  Once      12/30/19 1745     Place sequential compression device  Until discontinued      12/30/19 1745                De Leiva MD  Neurology  [facility]

## 2019-12-31 NOTE — HOSPITAL COURSE
Maryann Grant 64 y.o. female placed in observation for transient global amnesia. On admit, /100 and improved after lorazepam. Patient reported blood pressure elevated when goes to doctor appointments. MRI no stroke. US carotid no stenosis. 2 D echo showed normal EF, normal diastolic function, normal atrium, and no wall motion abnormality.  Carotid ultrasound no significant stenosis.  Patient stable for discharge home.

## 2019-12-31 NOTE — PROGRESS NOTES
Cholesterol Medicines  Your healthcare provider has prescribed medicine to help control your cholesterol. This sheet tells you how cholesterol affects your health. It also explains how medicines can help improve your cholesterol levels.  Understanding cholesterol  Cholesterol is a type of fat (lipid) thats carried in the blood. Your body makes cholesterol in the liver. You also get it from certain foods. Your body needs some cholesterol to stay healthy. But high cholesterol makes more plaque build up in blood vessels. Plaque is a fatty substance. Over time, this plaque narrows and hardens the blood vessels. This reduces or blocks blood flow in these vessels and raises your risk for heart attack (acute myocardial infarction, or AMI), stroke, and other health problems. This is known as atherosclerotic cardiovascular disease (ASCVD).  Types of lipids  Your blood has 3 key fats:  · LDL (low-density lipoprotein) cholesterol. This is called bad because it can cause plaque to build up in the blood vessels.  · HDL (high-density lipoprotein) cholesterol. This is called good because it helps get rid of harmful cholesterol in the bloodstream.  · Triglycerides. Your body uses this form of fat to store energy. Like LDL cholesterol, this fat can cause plaque to build up in the blood vessels.  Healthy cholesterol levels  You can find out your levels by having a blood test. High blood cholesterol is a risk factor for getting ASCVD. Talk to your healthcare provider about what levels are best for you. To find out more about cholesterol levels, visit www.heart.org/cholesterol.  You may need your cholesterol levels checked at regular intervals. This is to see if you are meeting the cholesterol goals you and your provider have agreed on. Make sure you know what this schedule will be and how to get ready for this test.   Types of cholesterol medicines    Medicines can help control the amount of cholesterol in the blood. There are  several types. Each controls cholesterol in a different way. They also have different effects in terms of how well they work to lower cholesterol and reduce death rates. Discuss these effects with your healthcare provider. Your healthcare provider will prescribe the type of medicine that is best for you. Medicines may be used alone or combined. The main types are:  · Statins (HMG-CoA reductase inhibitors). Statins are thought to be the best at lowering cholesterol. They do this by keeping your body from making cholesterol. Benefits: Statins lower LDL cholesterol. They also slightly raise HDL cholesterol and lower triglycerides.  · Selective cholesterol absorption inhibitors. These prevent your body from taking cholesterol from food. They may be prescribed to use alone or with a statin. Benefits: These medicines lower LDL cholesterol. They also slightly raise HDL cholesterol and lower triglycerides.  · Resins (bile acid sequestrants or bile acid-binding medicines). Resins help you get rid of cholesterol through the intestines. They work by binding to bile. Bile is a substance that helps the body digest food. Your body uses cholesterol to make bile. Normally, most bile is absorbed by the body during digestion. But when bile is bound to resin, it is eliminated from the body. So the body must make more bile. To do this, the body takes up more cholesterol from the blood. Benefits: Resins lower LDL cholesterol.  · Fibrates (fibric acid derivatives). These are best at cutting back on how much triglycerides your body makes. They dont work well to lower LDL. Benefits: Fibrates lower triglycerides. They also raise HDL cholesterol.  · Niacin (nicotinic acid). Niacin (vitamin B-3) affects how the liver makes blood fats. But dont use over-the-counter niacin for cholesterol problems. It isnt regulated by the FDA. Benefits: Niacin raises HDL cholesterol. It also lowers triglycerides and LDL cholesterol.  · Omega-3 fatty  acids. These reduce the amount of triglycerides your body makes. They also help to clear these lipids from the blood. Omega-3 fatty acids are found in many foods. These include salmon and other oily fish, and walnuts. Your healthcare provider may prescribe these fatty acids in capsule form. Benefits: Omega-3s lower triglycerides. (Note: They may increase LDL cholesterol in some patients.)   · PCSK9 inhibitors. These medicines lower LDL cholesterol levels by breaking down the chemicals in the liver that control making LDL cholesterol. These medicines are given by an injection. They are used for people who have an inherited form of high cholesterol (familial hypercholesterolemia). They are also for people who have a hard time controlling their cholesterol with other medicines.  High-risk groups  Some people may need to take medicines called statins to control their cholesterol. This is in addition to eating a healthy diet and getting regular exercise.  Statins can help you stay healthy. They can also help prevent a heart attack or stroke. You may need to take a statin if you are in one of these groups:  · Adults who have had a heart attack or stroke. Or adults who have had peripheral vascular disease, a ministroke (transient ischemic attack), or stable or unstable angina. This group also includes people who have had a procedure to restore blood flow through a blocked artery. These procedures include percutaneous coronary intervention, angioplasty, stent, and open-heart bypass surgery.  · Adults who have diabetes. Or adults who are at higher risk of having a heart attack or stroke and have an LDL cholesterol level of 70 to 189 mg/dL  · Adults who are 21 years old or older and have an LDL cholesterol level of 190 mg/dL or higher.  If you are in a high-risk group, talk with your healthcare provider about your treatment goals. Make sure you understand why these goals are important, based on your own health history and  your family history of heart disease or high cholesterol.  Make a plan to have regular cholesterol checks. You may need to fast before getting this test. Also ask your provider about any side effects your medicines may cause. Let your provider know about any side effects you have. You may need to take more than one medicine to reach the cholesterol goals that you and your provider decide on.  Taking cholesterol medicines  Take your medication exactly as your healthcare provider tells you to. This will help it work best. Here are tips for taking cholesterol medicines:  · Know when and how to take your medicines. Some may need to be taken with food. Others may need to be taken on an empty stomach or at a certain time of day.  · Stick to a schedule. Try the following:  ? Dont skip doses or stop taking your medicine. This is important even if you feel better or if your cholesterol numbers improve.  ? Set things up to help you remember. For instance, work taking your medicines into your routine. You could plan to take them when you get up in the morning or when you go to bed at night.  ? Keep track of what you take. You may take a few different medicines. If so, a list or chart can help you take the right pills at the right time. A pillbox with days of the week or times of day is also a good tool for keeping track.  · Prevent medicine interactions. Some medicines and supplements can interact with one another. This means they affect how other medicines work when taken together. Be sure to tell your healthcare provider about all other medicines you take. This includes vitamins, herbs, and over-the-counter medicines.  · Know how to deal with side effects. Many people have side effects when they first start taking a medicine. These are things like headache and stomach upset. Side effects should go away in a few weeks. Tell your healthcare provider about any side effects you have. Certain side effects should be reported to  your healthcare provider right away. These include yellowing of the eyes and blurred vision. Also report muscle aches and breathing problems.  If you are pregnant or breastfeeding, tell your health care provider before taking any cholesterol medicines.  A healthy lifestyle  Your healthcare provider will help you make changes your lifestyle if needed. These changes are needed to help you lower your cholesterol and keep the ASCVD from getting worse. Things you may need to work on are:  Diet  Your healthcare provider will give you information on changes that you may need to make to your diet. You may need to see a registered dietitian or nutritionist for help with these changes. You might be asked to:  · Eat less meat containing saturated fat and high levels of cholesterol  · Eat less sodium (salt), especially if you have high blood pressure  · Eat more fresh vegetables and fruits  · Eat lean protein, like fish, chicken and turkey, and beans and peas  · Eat less processed meats, like deli meats, sausage, and pepperoni  · Choose non-fat and low-fat milk, yogurt and cheese  · Use vegetable and nut oils instead of butter, shortening, and margarine  · Limit sweets and packaged foods, like chips, cookies, and baked goods  · Limit eating out at restaurants and eating fast foods  Physical activity  Your healthcare provider may ask you to be more active. Depending on your health, your provider may recommend that you get moderate to vigorous intensity exercise for at least 40 minutes each day, 3 to 4 days each week. Some examples of moderate to vigorous exercise are:  · Walking at a brisk pace, about 3-4 miles per hour  · Jogging or running  · Riding a bicycle or stationary bike  · Swimming or water aerobics  · Dancing  · Hiking  · Martial arts  · Tennis  You may not be able to do 40 minutes right away. You may have to start with 5 to 10 minutes a day, 2 times a day, and then keep adding to it until you can do 40  minutes.  Weight management  If you are overweight or obese, your healthcare provider may tell you to lose weight and lower your BMI (body mass index). Changing your diet and getting more exercise can help. Controlling the number of calories you eat is the best way to lose weight.  Smoking  If you smoke, quit now. Ask your health care provider for information on medications that can help you fight cravings. Enroll in a stop-smoking program to improve your chances of success. Ask your provider for smoking-cessation referrals and products.  Stress  Learn ways to help you deal with stress in your home and work life. Here are a few ideas:  · Take a yoga class. You can find classes at local community centers or on the Internet.  · Get regular exercise. Exercise helps your mind let go of problems and release stress in your muscles.  · Deep breathing. Take a few minutes several times a day to just sit quietly and breathe. Concentrate on the air going into and out of your body.  · Talk with loved ones. Take a few minutes each day to connect with people that make you feel supported.

## 2019-12-31 NOTE — ASSESSMENT & PLAN NOTE
Etiology is yet to be determined.  MRI-head was negative for acute abnormality.  Chest x-ray without acute process Patient is without fevers and meningismus. serum glucose was 93 mg/dL; electrolytes within normal limits; there is no evidence of uremia.  There is no evidence of thyroidal disease; skin is intact; and there are no rashes.  Patient is hemodynamically-stable and there has no been recent medication changes.  Clinical suspicion of CVA or subclinical seizures are low.  Will plan to perform neurological testing every 4 hours with frequent re-orientation.  Will also minimize medications and place fall precautions.    UA pending/Ammonia pending  Carotid US/Echo pending

## 2019-12-31 NOTE — DISCHARGE SUMMARY
[facility] Hospital Medicine  Discharge Summary      Patient Name: Maryann Grant  MRN: [id number]  Admission Date: 12/30/2019  Hospital Length of Stay: 0 days  Discharge Date and Time:  12/31/2019 5:12 PM  Attending Physician: No att. providers found   Discharging Provider: Aaliyah Gorman NP  Primary Care Provider: Risa Cochran MD      HPI:   Maryann Grant  64 y.o. female presents to the hosptial with a chief complaint of altered mental status. She reports for 4 hours 12/29 she does not remember anything that occurred. She was able to go to [employer] and eat. However, the next thing she remembers is watching a football game. These symptoms have never occurred before. She currently feels back to her baseline. Per family at bedside she is at baseline. During the event, the family at bedside, who is a nurse, reports she had a no neurologic deficits, but was disoriented to all short term memory events. She denies fever, chest pain, SOB, N/V, abdominal pain, dysuria, leg swelling, syncope, headache, numbness/weakness.    In the ED, MRI without acute abnormality, TSH 2.7, electrolytes within normal limits, afebrile without leukocytosis.     * No surgery found *      Hospital Course:   Maryann Grant 64 y.o. female placed in observation for transient global amnesia. On admit, /100 and improved after lorazepam. Patient reported blood pressure elevated when goes to doctor appointments. MRI no stroke. US carotid no stenosis. 2 D echo showed normal EF, normal diastolic function, normal atrium, and no wall motion abnormality.  Carotid ultrasound no significant stenosis.  Patient stable for discharge home.      Consults:     No new Assessment & Plan notes have been filed under this hospital service since the last note was generated.  Service: Hospital Medicine    Final Active Diagnoses:    Diagnosis Date Noted POA    PRINCIPAL PROBLEM:  Altered mental status [R41.82] 12/30/2019 Yes     Hyperlipidemia [E78.5] 08/01/2017 Yes      Problems Resolved During this Admission:       Discharged Condition: good    Disposition: Home or Self Care    Follow Up:  Follow-up Information     Risa Cochran MD On 1/3/2020.    Specialties:  Internal Medicine, Wound Care  Why:  Appointment scheduled for January 3rd at 10:20am  Contact information:  [address]  [place] [address]  [phone]             Trenton Kidd MD.    Specialty:  Neurology  Why:  call at time of discharge to schedule follow up appointment as needed  Contact information:  [address]  [address]  [place] [address]  [phone]                 Patient Instructions:      Diet Cardiac     Notify your health care provider if you experience any of the following:  temperature >100.4     Notify your health care provider if you experience any of the following:  difficulty breathing or increased cough     Notify your health care provider if you experience any of the following:  increased confusion or weakness       Significant Diagnostic Studies: Labs: All labs within the past 24 hours have been reviewed    Pending Diagnostic Studies:     None         Medications:  Reconciled Home Medications:      Medication List      START taking these medications    aspirin 81 MG Chew  Take 1 tablet (81 mg total) by mouth once daily.     pravastatin 40 MG tablet  Commonly known as:  PRAVACHOL  Take 1 tablet (40 mg total) by mouth every evening.        STOP taking these medications    baclofen 10 MG tablet  Commonly known as:  LIORESAL     desloratadine 5 mg tablet  Commonly known as:  CLARINEX     meclizine 25 mg tablet  Commonly known as:  ANTIVERT     ondansetron 4 MG Tbdl  Commonly known as:  ZOFRAN-ODT            Indwelling Lines/Drains at time of discharge:   Lines/Drains/Airways     None                 Time spent on the discharge of patient: 30 minutes  Patient was seen and examined on the date of discharge and determined to be suitable  for discharge.         Aaliyah Gorman NP  Department of Hospital Medicine  [facility]

## 2019-12-31 NOTE — SUBJECTIVE & OBJECTIVE
Past Medical History:   Diagnosis Date    History of bleeding ulcers        Past Surgical History:   Procedure Laterality Date    HYSTERECTOMY      TONSILLECTOMY      VAGINAL DELIVERY         Review of patient's allergies indicates:  No Known Allergies    No current facility-administered medications on file prior to encounter.      Current Outpatient Medications on File Prior to Encounter   Medication Sig    desloratadine (CLARINEX) 5 mg tablet Take 1 tablet (5 mg total) by mouth once daily.    meclizine (ANTIVERT) 25 mg tablet Take 1 tablet (25 mg total) by mouth 3 (three) times daily as needed for Dizziness.    baclofen (LIORESAL) 10 MG tablet Take 1 tablet (10 mg total) by mouth nightly as needed.    ondansetron (ZOFRAN-ODT) 4 MG TbDL Take 1 tablet (4 mg total) by mouth every 8 (eight) hours as needed (nausea).     Family History     Problem Relation (Age of Onset)    Diabetes Mother    Heart disease Mother, Father    Hypertension Father    Stroke Mother        Tobacco Use    Smoking status: Never Smoker    Smokeless tobacco: Never Used   Substance and Sexual Activity    Alcohol use: No    Drug use: No    Sexual activity: Yes     Partners: Male     Birth control/protection: Post-menopausal     Review of Systems   Constitutional: Negative for chills and fever.   HENT: Negative for nosebleeds and tinnitus.    Eyes: Negative for photophobia and visual disturbance.   Respiratory: Negative for shortness of breath and wheezing.    Cardiovascular: Negative for chest pain, palpitations and leg swelling.   Gastrointestinal: Negative for abdominal distention, nausea and vomiting.   Genitourinary: Negative for dysuria, flank pain and hematuria.   Musculoskeletal: Negative for gait problem and joint swelling.   Skin: Negative for rash and wound.   Neurological: Negative for seizures and syncope.   Psychiatric/Behavioral: Positive for confusion and sleep disturbance.     Objective:     Vital Signs (Most  Recent):  Temp: 98.4 °F (36.9 °C) (12/30/19 0936)  Pulse: 76 (12/30/19 1731)  Resp: 16 (12/30/19 1731)  BP: 133/62 (12/30/19 1731)  SpO2: 95 % (12/30/19 1731) Vital Signs (24h Range):  Temp:  [98.4 °F (36.9 °C)] 98.4 °F (36.9 °C)  Pulse:  [] 76  Resp:  [12-20] 16  SpO2:  [95 %-100 %] 95 %  BP: (133-189)/() 133/62     Weight: 79.4 kg (175 lb)  Body mass index is 24.41 kg/m².    Physical Exam   Constitutional: She is oriented to person, place, and time. She appears well-developed and well-nourished. No distress.   HENT:   Head: Normocephalic and atraumatic.   Eyes: Conjunctivae and EOM are normal. Right eye exhibits no discharge. Left eye exhibits no discharge.   Neck: Normal range of motion. No thyromegaly present.   Cardiovascular: Normal rate and regular rhythm.   No murmur heard.  Pulmonary/Chest: Effort normal and breath sounds normal. No respiratory distress.   Abdominal: Soft. Bowel sounds are normal. She exhibits no distension and no mass. There is no tenderness.   Musculoskeletal: She exhibits no edema or deformity.   Neurological: She is alert and oriented to person, place, and time. No sensory deficit. She exhibits normal muscle tone.   5/5 strength BUE and BLE. No past pointing on finger to nose. Heel to shin intact   Skin: Skin is warm and dry.   Psychiatric: She has a normal mood and affect. Her behavior is normal.   Serial 7's intact. Aware of current events   Nursing note and vitals reviewed.        CRANIAL NERVES     CN III, IV, VI   Extraocular motions are normal.        Significant Labs:   CBC:   Recent Labs   Lab 12/30/19  1018   WBC 5.21   HGB 13.7   HCT 41.8        CMP:   Recent Labs   Lab 12/30/19  1018      K 4.0      CO2 26   GLU 93   BUN 16   CREATININE 0.8   CALCIUM 8.7   PROT 7.1   ALBUMIN 4.1   BILITOT 1.1*   ALKPHOS 84   AST 16   ALT 17   ANIONGAP 5*   EGFRNONAA >60     Lipid Panel:   Recent Labs   Lab 12/30/19  1018   CHOL 217*   HDL 67   LDLCALC 133.4    TRIG 83   CHOLHDL 30.9     TSH:   Recent Labs   Lab 12/30/19  1018   TSH 2.788       Significant Imaging:   Imaging Results          MRI Brain W WO Contrast (Final result)  Result time 12/30/19 14:31:59    Final result by Anuj Suggs MD (12/30/19 14:31:59)                 Impression:      1. No acute intracranial abnormalities, no findings to suggest acute infarct or hemorrhage.  Please note there is motion artifact on the diffusion-weighted sequence.  2. Possible remote infarcts involving the left thalamus and left medial temporal region versus prominent perivascular spaces.  3. No abnormal parenchymal enhancement following contrast administration.      Electronically signed by: Anuj Suggs MD  Date:    12/30/2019  Time:    14:31             Narrative:    EXAMINATION:  MRI BRAIN W WO CONTRAST    CLINICAL HISTORY:  TIA, initial screening;    TECHNIQUE:  Multiplanar multisequence MR imaging of the brain was performed before and after the administration of 10 mL Gadavist intravenous contrast.    COMPARISON:  None    FINDINGS:  There is no intracranial mass, or acute hemorrhage. There is no restricted diffusion to suggest acute ischemia allowing for motion artifact..  There may be a remote infarct involving the anteromedial aspect of left thalamus.  There is remote infarct versus prominent perivascular space involving the inferior medial temporal region.  There is no hydrocephalus. There are no significant extra-axial or extracranial abnormalities.    The globes, orbits, pituitary gland, pineal gland and craniocervical junction are normal in configuration.  Following administration of contrast, there are no abnormal areas of enhancement.  The major vascular flow voids are patent.                               X-Ray Chest AP Portable (Final result)  Result time 12/30/19 10:41:52    Final result by Tung Grove MD (12/30/19 10:41:52)                 Impression:      1. Question emphysema.  No acute  pulmonary processes.      Electronically signed by: Tung Grove MD  Date:    12/30/2019  Time:    10:41             Narrative:    EXAMINATION:  XR CHEST AP PORTABLE    CLINICAL HISTORY:  Stroke;    TECHNIQUE:  Single frontal view of the chest was performed.    COMPARISON:  Chest 08/25/2010    FINDINGS:  Heart size remains within normal limits.  There is no tracheal abnormalities.    Lungs are well inflated.  Slight increased lucency predominant in the upper lobes most likely from emphysema.  Clinical correlation suggested.  No acute lobar consolidations or pneumothorax or pulmonary vascular congestion.  There is no cavitary lung masses.    There are cardiac monitoring leads over the chest.    There are postsurgical changes related to the left humerus associated with open reduction internal fixation.

## 2019-12-31 NOTE — PROGRESS NOTES
WRITTEN HEALTHCARE DISCHARGE INFORMATION      Things that YOU are RESPONSIBLE for to Manage Your Care At Home:     1. Getting your prescriptions filled.  2. Taking you medications as directed. DO NOT MISS ANY DOSES!  3. Going to your follow-up doctor appointments. This is important because it allows the doctor to monitor your progress and to determine if any changes need to be made to your treatment plan.     If you are unable to make your follow up appointments, please call the number listed and reschedule this appointment.      ____________HELP AT HOME____________________     Experiencing any SIGNS or SYMPTOMS: YOU CAN     Schedule a same day appopintment with your Primary Care Doctor or  you can call [facility] On Call Nurse Care Line for 24/7 assistance at [phone]     If you are experience any signs or symptoms that have become severe, Call 911 and come to your nearest Emergency Room.     Thank you for choosing [facility] and allowing us to care for you.   From your care management team:      You should receive a call from [facility] Discharge Department within 48-72 hours to help manage your care after discharge. Please try to make sure that you answer your phone for this important phone call.      Follow-up Information     Risa Cohcran MD On 1/3/2020.    Specialties:  Internal Medicine, Wound Care  Why:  Appointment scheduled for January 3rd at 10:20am  Contact information:  [address]  [place] [id number] [phone]             Trenton Kidd MD.    Specialty:  Neurology  Why:  call at time of discharge to schedule follow up appointment as needed  Contact information:  [address] [facility] [address]  [address]  [facility] [id number] [phone]

## 2019-12-31 NOTE — ED NOTES
Pt sitting in stretcher eating cardiac diet tray with no nausea. Updated on plan of care and need for urinalysis, verbalized understanding. Family at bedside.

## 2019-12-31 NOTE — NURSING TRANSFER
Nursing Transfer Note      12/30/2019    Transfer From: ED; To: Obs room 328A    Transfer via stretcher    Transfer with cardiac monitoring    Transported by Transport tech    Medicines sent:  No    Chart send with patient: Yes    Notified: spouse, son    Patient reassessed at: 2040 on 12/30/19    Upon arrival to floor: Pt ambulated from stretcher to bed with a steady gait. Cardiac monitor applied, patient oriented to room, call bell in reach and bed in lowest position and SR up x2. Pt is AAO x4 and denies having any pain at current time. Skin is clean, dry and intact and appears in no distress. Plan of care reviewed with pt and pt verbalized understanding. Will continue to monitor pt closely.

## 2019-12-31 NOTE — PLAN OF CARE
Problem: Adult Inpatient Plan of Care  Goal: Plan of Care Review  Outcome: Unable to Meet, Plan Revised   Pt remained free of falls during current shift. Denied pain and did not receive any prn pain medications. Neuro checks remained WNL and neurology consulted. MRI performed during previous shift and pt remained AAO x4 throughout shift. Plan of care and fall precautions reviewed with pt and verbalized understanding. Bed locked, lowered, SR up x2 and call light placed within reach.

## 2019-12-31 NOTE — PLAN OF CARE
"   12/31/19 1201   Final Note   Assessment Type Final Discharge Note   Anticipated Discharge Disposition Home   Hospital Follow Up  Appt(s) scheduled? Yes   Discharge plans and expectations educations in teach back method with documentation complete? Yes   Right Care Referral Info   Post Acute Recommendation No Care   pts nurse T[place] notified that the pt can d/c from CM standpoint.    EDUCATION:   provided with educational information on AMS.  Information reviewed and placed in :My Healthcare Packet" to be brought home  to use as resource after discharge.  Information included:  signs and symptoms to look for and call the doctor if experiencing, and symptoms that may indicate a medical emergency: CALL 911.      All questions answered.  Teach back method used.    Patient stated, " to go to scheduled follow up appointment and take medication as prescribed ".        "

## 2020-01-01 LAB — BACTERIA UR CULT: NORMAL

## 2020-01-02 ENCOUNTER — PATIENT OUTREACH (OUTPATIENT)
Dept: ADMINISTRATIVE | Facility: HOSPITAL | Age: 65
End: 2020-01-02

## 2020-01-02 DIAGNOSIS — M94.9 DISORDER OF BONE AND ARTICULAR CARTILAGE: Primary | ICD-10-CM

## 2020-01-02 DIAGNOSIS — M89.9 DISORDER OF BONE AND ARTICULAR CARTILAGE: Primary | ICD-10-CM

## 2020-01-03 ENCOUNTER — OFFICE VISIT (OUTPATIENT)
Dept: FAMILY MEDICINE | Facility: CLINIC | Age: 65
End: 2020-01-03
Payer: COMMERCIAL

## 2020-01-03 VITALS
OXYGEN SATURATION: 99 % | HEIGHT: 71 IN | TEMPERATURE: 98 F | WEIGHT: 177.94 LBS | HEART RATE: 89 BPM | SYSTOLIC BLOOD PRESSURE: 150 MMHG | DIASTOLIC BLOOD PRESSURE: 98 MMHG | BODY MASS INDEX: 24.91 KG/M2

## 2020-01-03 DIAGNOSIS — M89.9 BONE DISORDER: ICD-10-CM

## 2020-01-03 DIAGNOSIS — R03.0 ELEVATED BLOOD PRESSURE READING: ICD-10-CM

## 2020-01-03 DIAGNOSIS — Z23 NEED FOR VACCINATION WITH 13-POLYVALENT PNEUMOCOCCAL CONJUGATE VACCINE: ICD-10-CM

## 2020-01-03 DIAGNOSIS — G45.4 TRANSIENT GLOBAL AMNESIA: Primary | ICD-10-CM

## 2020-01-03 PROBLEM — E78.5 HYPERLIPIDEMIA: Status: RESOLVED | Noted: 2017-08-01 | Resolved: 2020-01-03

## 2020-01-03 PROBLEM — R41.82 ALTERED MENTAL STATUS: Status: RESOLVED | Noted: 2019-12-30 | Resolved: 2020-01-03

## 2020-01-03 PROCEDURE — 3008F BODY MASS INDEX DOCD: CPT | Mod: CPTII,S$GLB,, | Performed by: INTERNAL MEDICINE

## 2020-01-03 PROCEDURE — 99999 PR PBB SHADOW E&M-EST. PATIENT-LVL III: ICD-10-PCS | Mod: PBBFAC,,, | Performed by: INTERNAL MEDICINE

## 2020-01-03 PROCEDURE — 90471 PNEUMOCOCCAL CONJUGATE VACCINE 13-VALENT LESS THAN 5YO & GREATER THAN: ICD-10-PCS | Mod: S$GLB,,, | Performed by: INTERNAL MEDICINE

## 2020-01-03 PROCEDURE — 90670 PNEUMOCOCCAL CONJUGATE VACCINE 13-VALENT LESS THAN 5YO & GREATER THAN: ICD-10-PCS | Mod: S$GLB,,, | Performed by: INTERNAL MEDICINE

## 2020-01-03 PROCEDURE — 99214 PR OFFICE/OUTPT VISIT, EST, LEVL IV, 30-39 MIN: ICD-10-PCS | Mod: 25,S$GLB,, | Performed by: INTERNAL MEDICINE

## 2020-01-03 PROCEDURE — 99999 PR PBB SHADOW E&M-EST. PATIENT-LVL III: CPT | Mod: PBBFAC,,, | Performed by: INTERNAL MEDICINE

## 2020-01-03 PROCEDURE — 90670 PCV13 VACCINE IM: CPT | Mod: S$GLB,,, | Performed by: INTERNAL MEDICINE

## 2020-01-03 PROCEDURE — 99214 OFFICE O/P EST MOD 30 MIN: CPT | Mod: 25,S$GLB,, | Performed by: INTERNAL MEDICINE

## 2020-01-03 PROCEDURE — 3008F PR BODY MASS INDEX (BMI) DOCUMENTED: ICD-10-PCS | Mod: CPTII,S$GLB,, | Performed by: INTERNAL MEDICINE

## 2020-01-03 PROCEDURE — 1101F PR PT FALLS ASSESS DOC 0-1 FALLS W/OUT INJ PAST YR: ICD-10-PCS | Mod: CPTII,S$GLB,, | Performed by: INTERNAL MEDICINE

## 2020-01-03 PROCEDURE — 1101F PT FALLS ASSESS-DOCD LE1/YR: CPT | Mod: CPTII,S$GLB,, | Performed by: INTERNAL MEDICINE

## 2020-01-03 PROCEDURE — 90471 IMMUNIZATION ADMIN: CPT | Mod: S$GLB,,, | Performed by: INTERNAL MEDICINE

## 2020-01-03 NOTE — PROGRESS NOTES
Administered Pneumococcal 13 Vaccine 0.5mL IM to right deltoid. No s/s of any adverse reaction noted.

## 2020-01-03 NOTE — PROGRESS NOTES
SUBJECTIVE     Chief Complaint   Patient presents with    Hospital Follow Up     Transient amnesia    Medication Problem     pravastatin       HPI  Maryann Grant is a 64 y.o. female with multiple medical diagnoses as listed in the medical history and problem list that presents for follow-up after recent hospital discharge for transient global amnesia. Pt reports going to [employer] and praying and remained functional, but had several hours thereafter where she was disoriented. Pt can not recall anything during those 5 hours or so, but eventually went to the ED for further eval after this office urged her to do so. MRI, Carotid US, and TTE were all wnl and Neurology evaluated pt with a subsequent diagnosis of transient amnesia. Pt reports being fine since hospital discharge. Pt has had some headaches, but denies any AMS. She was started on Pravastatin, but could not tolerate it 2/2 muscle cramps and abdominal discomfort after 3 dosages so she discontinued it. She'd like to start natural supplements to decrease her cholesterol.     PAST MEDICAL HISTORY:  Past Medical History:   Diagnosis Date    Closed left arm fracture 02/05/2019    humeral, surgically repaired, metal plate present    Encounter for blood transfusion     History of bleeding ulcers     Intermittent memory loss 12/29/2019    reports no memory of event during & after Baptism from on 12/29/30 from 10:30am - 2:30pm       PAST SURGICAL HISTORY:  Past Surgical History:   Procedure Laterality Date    FRACTURE SURGERY Left 02/09/2019    humeral fracture, plate present    HYSTERECTOMY      TONSILLECTOMY      VAGINAL DELIVERY         SOCIAL HISTORY:  Social History     Socioeconomic History    Marital status:      Spouse name: Not on file    Number of children: Not on file    Years of education: Not on file    Highest education level: Not on file   Occupational History    Not on file   Social Needs    Financial resource strain: Not on file     Food insecurity:     Worry: Not on file     Inability: Not on file    Transportation needs:     Medical: Not on file     Non-medical: Not on file   Tobacco Use    Smoking status: Never Smoker    Smokeless tobacco: Never Used   Substance and Sexual Activity    Alcohol use: No    Drug use: No    Sexual activity: Yes     Partners: Male     Birth control/protection: Post-menopausal   Lifestyle    Physical activity:     Days per week: Not on file     Minutes per session: Not on file    Stress: Not on file   Relationships    Social connections:     Talks on phone: Not on file     Gets together: Not on file     Attends Church service: Not on file     Active member of club or organization: Not on file     Attends meetings of clubs or organizations: Not on file     Relationship status: Not on file   Other Topics Concern    Not on file   Social History Narrative    Not on file       FAMILY HISTORY:  Family History   Problem Relation Age of Onset    Heart disease Mother     Stroke Mother     Diabetes Mother     Heart disease Father     Hypertension Father        ALLERGIES AND MEDICATIONS: updated and reviewed.  Review of patient's allergies indicates:  No Known Allergies  Current Outpatient Medications   Medication Sig Dispense Refill    aspirin 81 MG Chew Take 1 tablet (81 mg total) by mouth once daily. 30 tablet 3     No current facility-administered medications for this visit.        ROS  Review of Systems   Constitutional: Negative for chills and fever.   HENT: Negative for hearing loss and sore throat.    Eyes: Negative for visual disturbance.   Respiratory: Negative for cough and shortness of breath.    Cardiovascular: Negative for chest pain, palpitations and leg swelling.   Gastrointestinal: Negative for abdominal pain, constipation, diarrhea, nausea and vomiting.   Genitourinary: Negative for dysuria, frequency and urgency.   Musculoskeletal: Negative for arthralgias, joint swelling and myalgias.  "  Skin: Negative for rash and wound.   Neurological: Negative for headaches.   Psychiatric/Behavioral: Negative for agitation and confusion. The patient is not nervous/anxious.          OBJECTIVE     Physical Exam  Vitals:    01/03/20 1035   BP: (!) 150/98   Pulse: 89   Temp: 98.1 °F (36.7 °C)    Body mass index is 24.81 kg/m².  Weight: 80.7 kg (177 lb 14.6 oz)   Height: 5' 11" (180.3 cm)     Physical Exam   Constitutional: She is oriented to person, place, and time. She appears well-developed and well-nourished. No distress.   HENT:   Head: Normocephalic and atraumatic.   Right Ear: External ear normal.   Left Ear: External ear normal.   Nose: Nose normal.   Mouth/Throat: Oropharynx is clear and moist.   Eyes: Conjunctivae and EOM are normal. Right eye exhibits no discharge. Left eye exhibits no discharge. No scleral icterus.   Neck: Normal range of motion. Neck supple. No JVD present. No tracheal deviation present.   Cardiovascular: Normal rate, regular rhythm and intact distal pulses. Exam reveals no gallop and no friction rub.   No murmur heard.  Pulmonary/Chest: Effort normal and breath sounds normal. No respiratory distress. She has no wheezes.   Abdominal: Soft. Bowel sounds are normal. She exhibits no distension and no mass. There is no tenderness. There is no rebound and no guarding.   Musculoskeletal: Normal range of motion. She exhibits no edema, tenderness or deformity.   Neurological: She is alert and oriented to person, place, and time. She exhibits normal muscle tone. Coordination normal.   Skin: Skin is warm and dry. No rash noted. No erythema.   Psychiatric: She has a normal mood and affect. Her behavior is normal. Judgment and thought content normal.         Health Maintenance       Date Due Completion Date    TETANUS VACCINE 10/08/1972 ---    DEXA SCAN 10/08/1994 ---    Shingles Vaccine (1 of 2) 10/08/2004 ---    Colonoscopy 10/08/2004 ---    Influenza Vaccine (1) 09/01/2019 11/7/2014    " Pneumococcal Vaccine (65+ Low/Medium Risk) (1 of 2 - PCV13) 10/08/2019 ---    Mammogram 08/19/2020 8/19/2019    Aspirin/Antiplatelet Therapy 01/03/2021 1/3/2020    Lipid Panel 12/30/2024 12/30/2019            ASSESSMENT     64 y.o. female with     1. Transient global amnesia    2. Elevated blood pressure reading    3. Bone disorder    4. Need for vaccination with 13-polyvalent pneumococcal conjugate vaccine        PLAN:     1. Transient global amnesia  - independently reviewed recent labs, MRI, TTE, and carotid ultrasound, which were all within normal limits  - patient's symptoms have now resolved; encouraged to keep follow-up appointment with Neurology  - Ambulatory referral to Neurology    2. Elevated blood pressure reading  - BP elevated above goal of <140/90; patient reports this is secondary to white coat hypertension as her BP is always within normal limits at home  -  Advised to maintain a low Na diet(<2g/day), exercise, and keep BP log to present to next visit  - will have patient return for visit with home BP cuff and compare nurse's BP manually to automated cuff    3. Bone disorder  - bone density ordered and scheduled today    4. Need for vaccination with 13-polyvalent pneumococcal conjugate vaccine  - (In Office Administered) Pneumococcal Conjugate Vaccine (13 Valent) (IM)        RTC in 2-4 weeks for nurse visit blood pressure check     Risa Cochran MD  01/03/2020 10:47 AM        No follow-ups on file.

## 2020-01-08 ENCOUNTER — PATIENT OUTREACH (OUTPATIENT)
Dept: ADMINISTRATIVE | Facility: OTHER | Age: 65
End: 2020-01-08

## 2020-01-08 DIAGNOSIS — Z12.11 COLON CANCER SCREENING: Primary | ICD-10-CM

## 2020-01-10 ENCOUNTER — OFFICE VISIT (OUTPATIENT)
Dept: NEUROLOGY | Facility: CLINIC | Age: 65
End: 2020-01-10
Payer: COMMERCIAL

## 2020-01-10 VITALS
WEIGHT: 175.5 LBS | DIASTOLIC BLOOD PRESSURE: 68 MMHG | BODY MASS INDEX: 24.48 KG/M2 | HEART RATE: 95 BPM | SYSTOLIC BLOOD PRESSURE: 144 MMHG

## 2020-01-10 DIAGNOSIS — G45.4 TRANSIENT GLOBAL AMNESIA: Primary | ICD-10-CM

## 2020-01-10 PROCEDURE — 1101F PT FALLS ASSESS-DOCD LE1/YR: CPT | Mod: CPTII,S$GLB,, | Performed by: NEUROLOGICAL SURGERY

## 2020-01-10 PROCEDURE — 3008F BODY MASS INDEX DOCD: CPT | Mod: CPTII,S$GLB,, | Performed by: NEUROLOGICAL SURGERY

## 2020-01-10 PROCEDURE — 99215 PR OFFICE/OUTPT VISIT, EST, LEVL V, 40-54 MIN: ICD-10-PCS | Mod: S$GLB,,, | Performed by: NEUROLOGICAL SURGERY

## 2020-01-10 PROCEDURE — 3008F PR BODY MASS INDEX (BMI) DOCUMENTED: ICD-10-PCS | Mod: CPTII,S$GLB,, | Performed by: NEUROLOGICAL SURGERY

## 2020-01-10 PROCEDURE — 1101F PR PT FALLS ASSESS DOC 0-1 FALLS W/OUT INJ PAST YR: ICD-10-PCS | Mod: CPTII,S$GLB,, | Performed by: NEUROLOGICAL SURGERY

## 2020-01-10 PROCEDURE — 99999 PR PBB SHADOW E&M-EST. PATIENT-LVL III: ICD-10-PCS | Mod: PBBFAC,,, | Performed by: NEUROLOGICAL SURGERY

## 2020-01-10 PROCEDURE — 99999 PR PBB SHADOW E&M-EST. PATIENT-LVL III: CPT | Mod: PBBFAC,,, | Performed by: NEUROLOGICAL SURGERY

## 2020-01-10 PROCEDURE — 99215 OFFICE O/P EST HI 40 MIN: CPT | Mod: S$GLB,,, | Performed by: NEUROLOGICAL SURGERY

## 2020-01-10 NOTE — PROGRESS NOTES
Chief Complaint   Patient presents with    Consult     Transient global amnesia        Maryann Grant is a 64 y.o. female with a history of multiple medical diagnoses as listed below that presents for evaluation of an episode of transient global amnesia.  The patient says that she recalls waking up on December 29th and preparing herself to go to [employer].  She says that she drove to the building and after exiting the car she was able to pray with a friend.  She says that after which she went to sit with her  and is unable to recall anything that occurred for the next 4-5 hours.  Her  was there and says that he recalls that she was constantly taking her phone from her purse to check to make sure it was set to vibrate, then she would place it back in.  This was done constantly throughout the time of the service.  She seemed somewhat unlike herself afterward so he offer to drive home.  They stopped to get some food which she ate according to her .  She did not seem like herself so he offered her a Coke to drink thinking that her blood sugar may be low and also called their daughter-in-law who is a nurse.  When she arrived she checked the patient's vitals which seemed to be significant for an elevated blood pressure, but otherwise normal with regards to respirations, heart rate, and blood sugar.  The patient says that she recalls waking up at approximately 3:30 p.m. when she was sitting in a rocking chair watching football.  She says that she typically does not watch football so this is very unlike her.  Her  was observing her than tired time and did not notice any abnormal or atypical movements.    PAST MEDICAL HISTORY:  Past Medical History:   Diagnosis Date    Closed left arm fracture 02/05/2019    humeral, surgically repaired, metal plate present    Encounter for blood transfusion     History of bleeding ulcers     Intermittent memory loss 12/29/2019    reports no memory of event  during & after [facility] from on 12/29/30 from 10:30am - 2:30pm       PAST SURGICAL HISTORY:  Past Surgical History:   Procedure Laterality Date    FRACTURE SURGERY Left 02/09/2019    humeral fracture, plate present    HYSTERECTOMY      TONSILLECTOMY      VAGINAL DELIVERY         SOCIAL HISTORY:  Social History     Socioeconomic History    Marital status:      Spouse name: Not on file    Number of children: Not on file    Years of education: Not on file    Highest education level: Not on file   Occupational History    Not on file   Social Needs    Financial resource strain: Not on file    Food insecurity:     Worry: Not on file     Inability: Not on file    Transportation needs:     Medical: Not on file     Non-medical: Not on file   Tobacco Use    Smoking status: Never Smoker    Smokeless tobacco: Never Used   Substance and Sexual Activity    Alcohol use: No    Drug use: No    Sexual activity: Yes     Partners: Male     Birth control/protection: Post-menopausal   Lifestyle    Physical activity:     Days per week: Not on file     Minutes per session: Not on file    Stress: Not on file   Relationships    Social connections:     Talks on phone: Not on file     Gets together: Not on file     Attends [employer] service: Not on file     Active member of club or organization: Not on file     Attends meetings of clubs or organizations: Not on file     Relationship status: Not on file   Other Topics Concern    Not on file   Social History Narrative    Not on file       FAMILY HISTORY:  Family History   Problem Relation Age of Onset    Heart disease Mother     Stroke Mother     Diabetes Mother     Heart disease Father     Hypertension Father        ALLERGIES AND MEDICATIONS: updated and reviewed.  Review of patient's allergies indicates:  No Known Allergies  Current Outpatient Medications   Medication Sig Dispense Refill    aspirin 81 MG Chew Take 1 tablet (81 mg total) by mouth once daily. 30  tablet 3     No current facility-administered medications for this visit.        Review of Systems   Constitutional: Negative for activity change, appetite change, fever and unexpected weight change.   HENT: Negative for trouble swallowing and voice change.    Eyes: Negative for photophobia and visual disturbance.   Respiratory: Negative for apnea and shortness of breath.    Cardiovascular: Negative for chest pain and leg swelling.   Gastrointestinal: Negative for constipation and nausea.   Genitourinary: Negative for difficulty urinating.   Musculoskeletal: Negative for back pain, gait problem and neck pain.   Skin: Negative for color change and pallor.   Neurological: Negative for dizziness, seizures, syncope, weakness and numbness.   Hematological: Negative for adenopathy.   Psychiatric/Behavioral: Positive for confusion. Negative for agitation and decreased concentration.       Neurologic Exam     Mental Status   Oriented to person, place, and time.   Registration: recalls 3 of 3 objects.   Attention: normal. Concentration: normal.   Speech: speech is normal   Level of consciousness: alert  Knowledge: good.     Cranial Nerves     CN II   Visual fields full to confrontation.   Right visual field deficit: none  Left visual field deficit: none     CN III, IV, VI   Pupils are equal, round, and reactive to light.  Extraocular motions are normal.   Right pupil: Size: 3 mm. Shape: regular. Accommodation: intact.   Left pupil: Size: 3 mm. Shape: regular. Accommodation: intact.   CN III: no CN III palsy  CN VI: no CN VI palsy  Nystagmus: none   Diplopia: none  Ophthalmoparesis: none  Upgaze: normal  Downgaze: normal  Conjugate gaze: present    CN V   Facial sensation intact.   Right facial sensation deficit: none  Left facial sensation deficit: none    CN VII   Facial expression full, symmetric.   Right facial weakness: none  Left facial weakness: none    CN VIII   CN VIII normal.     CN IX, X   CN IX normal.   CN X  normal.   Palate: symmetric    CN XI   CN XI normal.   Right sternocleidomastoid strength: normal  Left sternocleidomastoid strength: normal  Right trapezius strength: normal  Left trapezius strength: normal    CN XII   CN XII normal.   Tongue deviation: none    Motor Exam   Muscle bulk: normal  Overall muscle tone: normal  Right arm tone: normal  Left arm tone: normal  Right leg tone: normal  Left leg tone: normal    Strength   Strength 5/5 throughout.     Sensory Exam   Right arm light touch: normal  Left arm light touch: normal  Right leg light touch: normal  Left leg light touch: normal  Right arm vibration: normal  Left arm vibration: normal  Right leg vibration: normal  Left leg vibration: normal  Right arm proprioception: normal  Left arm proprioception: normal  Right leg proprioception: normal  Left leg proprioception: normal  Right arm pinprick: normal  Left arm pinprick: normal  Right leg pinprick: normal  Left leg pinprick: normal    Gait, Coordination, and Reflexes     Gait  Gait: normal    Coordination   Romberg: negative  Finger to nose coordination: normal  Heel to shin coordination: normal  Tandem walking coordination: normal    Tremor   Resting tremor: absent    Reflexes   Right brachioradialis: 2+  Left brachioradialis: 2+  Right biceps: 2+  Left biceps: 2+  Right triceps: 2+  Left triceps: 2+  Right patellar: 2+  Left patellar: 2+  Right achilles: 2+  Left achilles: 2+  Right plantar: normal  Left plantar: normal      Physical Exam   Constitutional: She is oriented to person, place, and time. She appears well-developed and well-nourished.   HENT:   Head: Normocephalic and atraumatic.   Eyes: Pupils are equal, round, and reactive to light. EOM are normal.   Neck: Normal range of motion.   Cardiovascular: Normal rate and intact distal pulses.   Pulmonary/Chest: Effort normal. No apnea. No respiratory distress.   Musculoskeletal: Normal range of motion.   Neurological: She is alert and oriented to  person, place, and time. She has normal strength. She has a normal Finger-Nose-Finger Test, a normal Heel to Shin Test, a normal Romberg Test and a normal Tandem Gait Test. Gait normal.   Reflex Scores:       Tricep reflexes are 2+ on the right side and 2+ on the left side.       Bicep reflexes are 2+ on the right side and 2+ on the left side.       Brachioradialis reflexes are 2+ on the right side and 2+ on the left side.       Patellar reflexes are 2+ on the right side and 2+ on the left side.       Achilles reflexes are 2+ on the right side and 2+ on the left side.  Skin: Skin is warm and dry.   Psychiatric: She has a normal mood and affect. Her speech is normal and behavior is normal. Thought content normal.   Vitals reviewed.      Vitals:    01/10/20 1301   BP: (!) 144/68   BP Location: Right arm   Patient Position: Sitting   BP Method: Large (Automatic)   Pulse: 95   Weight: 79.6 kg (175 lb 7.8 oz)       Assessment & Plan:    Problem List Items Addressed This Visit     Transient global amnesia - Primary    Overview     Differential for the patient epileptic event include a hypertensive urgency.  Patient's blood pressure was noted to be high by her daughter although the documentation in the hospital seemed to have normotensive readings.  Partial seizures also within the possible diagnostic options that need to be explored         Relevant Orders    EEG,w/awake & drowsy record        More than 50% of this 60 minute encounter was spent in counseling and coordinating care of amnesia.  Follow-up: Follow up in about 1 month (around 2/10/2020).    This note was done with the assistance of voice recognition software. Some errors may be present after proofreading.

## 2020-01-10 NOTE — LETTER
January 15, 2020      Risa Cochran MD  [address] [address]  [address]  [place] [address]           [facility] [address]  [place] [address]  Phone: [phone]  Fax: [phone]          Patient: Maryann Grant   MR Number: [id number]   YOB: 1955   Date of Visit: 1/10/2020       Dear Dr. Risa Cochran:    Thank you for referring Maryann Grant to me for evaluation. Attached you will find relevant portions of my assessment and plan of care.    If you have questions, please do not hesitate to call me. I look forward to following Maryann Grant along with you.    Sincerely,    Trenton Kidd MD    Enclosure  CC:  No Recipients    If you would like to receive this communication electronically, please contact [email or web address] or [phone] to request more information on [employer] Link access.    For providers and/or their staff who would like to refer a patient to [facility], please contact us through our one-stop-shop provider referral line, [facility], at [phone].    If you feel you have received this communication in error or would no longer like to receive these types of communications, please e-mail [email or web address]

## 2020-01-27 ENCOUNTER — CLINICAL SUPPORT (OUTPATIENT)
Dept: FAMILY MEDICINE | Facility: CLINIC | Age: 65
End: 2020-01-27
Payer: COMMERCIAL

## 2020-01-27 VITALS — SYSTOLIC BLOOD PRESSURE: 130 MMHG | DIASTOLIC BLOOD PRESSURE: 76 MMHG

## 2020-01-27 DIAGNOSIS — R03.0 ELEVATED BLOOD PRESSURE READING IN OFFICE WITHOUT DIAGNOSIS OF HYPERTENSION: Primary | ICD-10-CM

## 2020-01-27 PROCEDURE — 99999 PR PBB SHADOW E&M-EST. PATIENT-LVL II: ICD-10-PCS | Mod: PBBFAC,,,

## 2020-01-27 PROCEDURE — 99999 PR PBB SHADOW E&M-EST. PATIENT-LVL II: CPT | Mod: PBBFAC,,,

## 2020-01-27 PROCEDURE — 99499 UNLISTED E&M SERVICE: CPT | Mod: S$GLB,,, | Performed by: INTERNAL MEDICINE

## 2020-01-27 PROCEDURE — 99499 NO LOS: ICD-10-PCS | Mod: S$GLB,,, | Performed by: INTERNAL MEDICINE

## 2020-01-27 NOTE — PROGRESS NOTES
Maryann Grant 64 y.o. female is here today for Blood Pressure check.   History of HTN no.    Review of patient's allergies indicates:  No Known Allergies  Creatinine   Date Value Ref Range Status   12/31/2019 0.8 0.5 - 1.4 mg/dL Final     Sodium   Date Value Ref Range Status   12/31/2019 139 136 - 145 mmol/L Final     Potassium   Date Value Ref Range Status   12/31/2019 3.7 3.5 - 5.1 mmol/L Final   ]  Patient denies taking blood pressure medications on a regular basis at the same time of the day.     Current Outpatient Medications:     aspirin 81 MG Chew, Take 1 tablet (81 mg total) by mouth once daily., Disp: 30 tablet, Rfl: 3  Does patient have record of home blood pressure readings yes. Readings have been averaging 120/80.   Last dose of blood pressure medication was taken at n/a.  Patient is asymptomatic.   Complains of n/a.    Vitals:    01/27/20 1003   BP: (!) 160/84   BP Location: Right arm   Patient Position: Sitting   BP Method: Large (Manual)     Patient brought in 3 weeks or BP home numbers, doing bid.  All within acceptable range.  Pt home machine matched nurse manual reading today. Pt states as soon as she comes into the office her BP goes up.     Dr. Cochran informed of nurse visit.

## 2020-01-30 ENCOUNTER — HOSPITAL ENCOUNTER (OUTPATIENT)
Dept: RADIOLOGY | Facility: HOSPITAL | Age: 65
Discharge: HOME | End: 2020-01-30
Attending: INTERNAL MEDICINE
Payer: COMMERCIAL

## 2020-01-30 DIAGNOSIS — M81.0 AGE-RELATED OSTEOPOROSIS WITHOUT CURRENT PATHOLOGICAL FRACTURE: Primary | ICD-10-CM

## 2020-01-30 DIAGNOSIS — M94.9 DISORDER OF BONE AND ARTICULAR CARTILAGE: ICD-10-CM

## 2020-01-30 DIAGNOSIS — M89.9 DISORDER OF BONE AND ARTICULAR CARTILAGE: ICD-10-CM

## 2020-01-30 PROCEDURE — 77080 DXA BONE DENSITY AXIAL: CPT | Mod: 26,,, | Performed by: RADIOLOGY

## 2020-01-30 PROCEDURE — 77080 DEXA BONE DENSITY SPINE HIP: ICD-10-PCS | Mod: 26,,, | Performed by: RADIOLOGY

## 2020-01-30 PROCEDURE — 77080 DXA BONE DENSITY AXIAL: CPT | Mod: TC

## 2020-01-30 RX ORDER — ALENDRONATE SODIUM 70 MG/1
70 TABLET ORAL
Qty: 12 TABLET | Refills: 3 | Status: SHIPPED | OUTPATIENT
Start: 2020-01-30 | End: 2022-02-24

## 2020-03-09 DIAGNOSIS — Z12.11 SPECIAL SCREENING FOR MALIGNANT NEOPLASM OF COLON: Primary | ICD-10-CM

## 2020-04-03 ENCOUNTER — TELEPHONE (OUTPATIENT)
Dept: NEUROLOGY | Facility: CLINIC | Age: 65
End: 2020-04-03

## 2020-10-05 ENCOUNTER — PATIENT MESSAGE (OUTPATIENT)
Dept: ADMINISTRATIVE | Facility: HOSPITAL | Age: 65
End: 2020-10-05

## 2020-10-30 ENCOUNTER — PATIENT MESSAGE (OUTPATIENT)
Dept: ADMINISTRATIVE | Facility: HOSPITAL | Age: 65
End: 2020-10-30

## 2021-01-05 ENCOUNTER — PATIENT MESSAGE (OUTPATIENT)
Dept: ADMINISTRATIVE | Facility: HOSPITAL | Age: 66
End: 2021-01-05

## 2021-04-06 ENCOUNTER — PATIENT MESSAGE (OUTPATIENT)
Dept: ADMINISTRATIVE | Facility: HOSPITAL | Age: 66
End: 2021-04-06

## 2021-07-06 ENCOUNTER — PATIENT MESSAGE (OUTPATIENT)
Dept: ADMINISTRATIVE | Facility: HOSPITAL | Age: 66
End: 2021-07-06

## 2021-07-07 ENCOUNTER — PATIENT MESSAGE (OUTPATIENT)
Dept: ADMINISTRATIVE | Facility: HOSPITAL | Age: 66
End: 2021-07-07

## 2021-10-04 ENCOUNTER — PATIENT MESSAGE (OUTPATIENT)
Dept: ADMINISTRATIVE | Facility: HOSPITAL | Age: 66
End: 2021-10-04

## 2021-11-19 ENCOUNTER — TELEPHONE (OUTPATIENT)
Dept: FAMILY MEDICINE | Facility: CLINIC | Age: 66
End: 2021-11-19
Payer: COMMERCIAL

## 2021-11-19 DIAGNOSIS — Z12.31 BREAST CANCER SCREENING BY MAMMOGRAM: Primary | ICD-10-CM

## 2022-02-24 ENCOUNTER — PATIENT MESSAGE (OUTPATIENT)
Dept: FAMILY MEDICINE | Facility: CLINIC | Age: 67
End: 2022-02-24

## 2022-02-24 ENCOUNTER — OFFICE VISIT (OUTPATIENT)
Dept: FAMILY MEDICINE | Facility: CLINIC | Age: 67
End: 2022-02-24
Payer: MEDICARE

## 2022-02-24 VITALS
SYSTOLIC BLOOD PRESSURE: 128 MMHG | HEIGHT: 71 IN | OXYGEN SATURATION: 98 % | BODY MASS INDEX: 24.92 KG/M2 | HEART RATE: 88 BPM | RESPIRATION RATE: 18 BRPM | DIASTOLIC BLOOD PRESSURE: 82 MMHG | TEMPERATURE: 98 F | WEIGHT: 178 LBS

## 2022-02-24 DIAGNOSIS — H53.40 VISUAL FIELD DEFECT: Primary | ICD-10-CM

## 2022-02-24 DIAGNOSIS — Z12.11 ENCOUNTER FOR SCREENING COLONOSCOPY: ICD-10-CM

## 2022-02-24 DIAGNOSIS — R79.9 ABNORMAL FINDING OF BLOOD CHEMISTRY, UNSPECIFIED: ICD-10-CM

## 2022-02-24 DIAGNOSIS — R53.83 OTHER FATIGUE: ICD-10-CM

## 2022-02-24 DIAGNOSIS — G45.4 TRANSIENT GLOBAL AMNESIA: ICD-10-CM

## 2022-02-24 DIAGNOSIS — Z00.00 ANNUAL PHYSICAL EXAM: Primary | ICD-10-CM

## 2022-02-24 PROCEDURE — 99214 OFFICE O/P EST MOD 30 MIN: CPT | Mod: PBBFAC,PO | Performed by: INTERNAL MEDICINE

## 2022-02-24 PROCEDURE — 99397 PER PM REEVAL EST PAT 65+ YR: CPT | Mod: S$PBB,,, | Performed by: INTERNAL MEDICINE

## 2022-02-24 PROCEDURE — 99999 PR PBB SHADOW E&M-EST. PATIENT-LVL IV: CPT | Mod: PBBFAC,,, | Performed by: INTERNAL MEDICINE

## 2022-02-24 PROCEDURE — 99999 PR PBB SHADOW E&M-EST. PATIENT-LVL IV: ICD-10-PCS | Mod: PBBFAC,,, | Performed by: INTERNAL MEDICINE

## 2022-02-24 PROCEDURE — 99397 PR PREVENTIVE VISIT,EST,65 & OVER: ICD-10-PCS | Mod: S$PBB,,, | Performed by: INTERNAL MEDICINE

## 2022-02-24 NOTE — PROGRESS NOTES
Health Maintenance Due   Topic Date Due    Colorectal Cancer Screening  PEND     Shingles Vaccine (1 of 2) Hx chickenpox, notified can get vaccine at pharmacy.       TETANUS VACCINE  09/14/2015    Mammogram  08/19/2020    Pneumococcal Vaccines (Age 65+) (2 of 2 - PPSV23) 01/03/2021    COVID-19 Vaccine (2 - Booster for Lupe series) 05/24/2021    Influenza Vaccine (1) REFUSED

## 2022-02-24 NOTE — PROGRESS NOTES
SUBJECTIVE     Chief Complaint   Patient presents with    Annual Exam       HPI  Maryann Grant is a 66 y.o. female with multiple medical diagnoses as listed in the medical history and problem list that presents for annual exam. Pt has been doing well since her last visit. She has a good appetite and eats well. She does not exercise. She sleeps for ~4-6 hours nightly, but it is broken. Pt does take OTC supplements, which are MVI. She does have any current stressors, but prays to de-stress. Pt is not UTD on age appropriate CA screening.    PAST MEDICAL HISTORY:  Past Medical History:   Diagnosis Date    Closed left arm fracture 02/05/2019    humeral, surgically repaired, metal plate present    Encounter for blood transfusion     History of bleeding ulcers     Intermittent memory loss 12/29/2019    reports no memory of event during & after [employer] from on 12/29/30 from 10:30am - 2:30pm       PAST SURGICAL HISTORY:  Past Surgical History:   Procedure Laterality Date    FRACTURE SURGERY Left 02/09/2019    humeral fracture, plate present    HYSTERECTOMY      TONSILLECTOMY      VAGINAL DELIVERY         SOCIAL HISTORY:  Social History     Socioeconomic History    Marital status:    Tobacco Use    Smoking status: Never Smoker    Smokeless tobacco: Never Used   Substance and Sexual Activity    Alcohol use: No    Drug use: No    Sexual activity: Yes     Partners: Male     Birth control/protection: Post-menopausal       FAMILY HISTORY:  Family History   Problem Relation Age of Onset    Heart disease Mother     Stroke Mother     Diabetes Mother     Heart disease Father     Hypertension Father        ALLERGIES AND MEDICATIONS: updated and reviewed.  Review of patient's allergies indicates:  No Known Allergies  Current Outpatient Medications   Medication Sig Dispense Refill    aspirin 81 MG Chew Take 1 tablet (81 mg total) by mouth once daily. 30 tablet 3     No current facility-administered medications  "for this visit.       ROS  Review of Systems   Constitutional: Negative for chills and fever.   HENT: Negative for hearing loss and sore throat.    Eyes: Negative for visual disturbance.   Respiratory: Negative for cough and shortness of breath.    Cardiovascular: Negative for chest pain, palpitations and leg swelling.   Gastrointestinal: Negative for abdominal pain, constipation, diarrhea, nausea and vomiting.   Genitourinary: Negative for dysuria, frequency and urgency.   Musculoskeletal: Negative for arthralgias, joint swelling and myalgias.   Skin: Negative for rash and wound.   Neurological: Negative for headaches.   Psychiatric/Behavioral: Negative for agitation and confusion. The patient is not nervous/anxious.          OBJECTIVE     Physical Exam  Vitals:    02/24/22 1359   BP: 128/82   Pulse: 88   Resp: 18   Temp: 98.3 °F (36.8 °C)    Body mass index is 24.83 kg/m².  Weight: 80.7 kg (178 lb)   Height: 5' 11" (180.3 cm)     Physical Exam  Constitutional:       General: She is not in acute distress.     Appearance: She is well-developed.   HENT:      Head: Normocephalic and atraumatic.      Right Ear: External ear normal.      Left Ear: External ear normal.      Nose: Nose normal.   Eyes:      General: No scleral icterus.        Right eye: No discharge.         Left eye: No discharge.      Conjunctiva/sclera: Conjunctivae normal.   Neck:      Vascular: No JVD.      Trachea: No tracheal deviation.   Cardiovascular:      Rate and Rhythm: Normal rate and regular rhythm.      Heart sounds: No murmur heard.    No friction rub. No gallop.   Pulmonary:      Effort: Pulmonary effort is normal. No respiratory distress.      Breath sounds: Normal breath sounds. No wheezing.   Abdominal:      General: Bowel sounds are normal. There is no distension.      Palpations: Abdomen is soft. There is no mass.      Tenderness: There is no abdominal tenderness. There is no guarding or rebound.   Musculoskeletal:         General: " No tenderness or deformity. Normal range of motion.      Cervical back: Normal range of motion and neck supple.   Skin:     General: Skin is warm and dry.      Findings: No erythema or rash.   Neurological:      Mental Status: She is alert and oriented to person, place, and time.      Motor: No abnormal muscle tone.      Coordination: Coordination normal.   Psychiatric:         Behavior: Behavior normal.         Thought Content: Thought content normal.         Judgment: Judgment normal.           Health Maintenance       Date Due Completion Date    Colorectal Cancer Screening Never done ---    Shingles Vaccine (1 of 2) Never done ---    TETANUS VACCINE 09/14/2015 9/14/2005    Mammogram 08/19/2020 8/19/2019    Pneumococcal Vaccines (Age 65+) (2 of 2 - PPSV23) 01/03/2021 1/3/2020    COVID-19 Vaccine (2 - Booster for Lupe series) 05/24/2021 3/29/2021    Influenza Vaccine (1) 09/01/2021 1/13/2020 (Declined)    Override on 1/13/2020: Declined (pt declined immunization until next season)    DEXA Scan 01/30/2023 1/30/2020    Lipid Panel 12/30/2024 12/30/2019            ASSESSMENT     66 y.o. female with     1. Annual physical exam    2. Transient global amnesia    3. Abnormal finding of blood chemistry, unspecified     4. Other fatigue     5. Encounter for screening colonoscopy        PLAN:     1. Annual physical exam  - Counseled on age appropriate medical preventative services, including age appropriate cancer screenings, over all nutritional health, need for a consistent exercise regimen and an over all push towards maintaining a vigorous and active lifestyle.  Counseled on age appropriate vaccines and discussed upcoming health care needs based on age/gender.  Spent time with patient counseling on need for a good patient/doctor relationship moving forward.  Discussed use of common OTC medications and supplements.  Discussed common dietary aids and use of caffeine and the need for good sleep hygiene and stress  management.  - CBC Auto Differential; Future  - Comprehensive Metabolic Panel; Future  - Hemoglobin A1C; Future  - TSH; Future  - Lipid Panel; Future  - CBC Auto Differential  - Comprehensive Metabolic Panel  - Hemoglobin A1C  - TSH  - Lipid Panel    2. Transient global amnesia  - Stable; no acute issues    3. Abnormal finding of blood chemistry, unspecified   - CBC Auto Differential; Future  - Comprehensive Metabolic Panel; Future  - Hemoglobin A1C; Future  - TSH; Future  - Lipid Panel; Future  - CBC Auto Differential  - Comprehensive Metabolic Panel  - Hemoglobin A1C  - TSH  - Lipid Panel    4. Other fatigue   - TSH; Future  - Lipid Panel; Future  - TSH  - Lipid Panel    5. Encounter for screening colonoscopy  - Cologuard Screening (Multitarget Stool DNA); Future  - Cologuard Screening (Multitarget Stool DNA)        RTC in 1 year     Risa Cochran MD  02/24/2022 2:09 PM        No follow-ups on file.

## 2022-03-04 ENCOUNTER — HOSPITAL ENCOUNTER (OUTPATIENT)
Dept: RADIOLOGY | Facility: HOSPITAL | Age: 67
Discharge: HOME | End: 2022-03-04
Attending: INTERNAL MEDICINE
Payer: MEDICARE

## 2022-03-04 VITALS — HEIGHT: 71 IN | WEIGHT: 177.94 LBS | BODY MASS INDEX: 24.91 KG/M2

## 2022-03-04 DIAGNOSIS — Z12.31 BREAST CANCER SCREENING BY MAMMOGRAM: ICD-10-CM

## 2022-03-04 PROCEDURE — 77067 SCR MAMMO BI INCL CAD: CPT | Mod: 26,,, | Performed by: RADIOLOGY

## 2022-03-04 PROCEDURE — 77063 BREAST TOMOSYNTHESIS BI: CPT | Mod: TC

## 2022-03-04 PROCEDURE — 77063 MAMMO DIGITAL SCREENING BILAT WITH TOMO: ICD-10-PCS | Mod: 26,,, | Performed by: RADIOLOGY

## 2022-03-04 PROCEDURE — 77063 BREAST TOMOSYNTHESIS BI: CPT | Mod: 26,,, | Performed by: RADIOLOGY

## 2022-03-04 PROCEDURE — 77067 MAMMO DIGITAL SCREENING BILAT WITH TOMO: ICD-10-PCS | Mod: 26,,, | Performed by: RADIOLOGY

## 2022-03-09 ENCOUNTER — OFFICE VISIT (OUTPATIENT)
Dept: CARDIOLOGY | Facility: CLINIC | Age: 67
End: 2022-03-09
Payer: MEDICARE

## 2022-03-09 VITALS
HEART RATE: 89 BPM | BODY MASS INDEX: 25.16 KG/M2 | WEIGHT: 179.69 LBS | SYSTOLIC BLOOD PRESSURE: 177 MMHG | HEIGHT: 71 IN | OXYGEN SATURATION: 98 % | DIASTOLIC BLOOD PRESSURE: 83 MMHG | RESPIRATION RATE: 16 BRPM

## 2022-03-09 DIAGNOSIS — G45.9 TIA (TRANSIENT ISCHEMIC ATTACK): ICD-10-CM

## 2022-03-09 PROCEDURE — 99204 OFFICE O/P NEW MOD 45 MIN: CPT | Mod: S$PBB,,, | Performed by: INTERNAL MEDICINE

## 2022-03-09 PROCEDURE — 93005 ELECTROCARDIOGRAM TRACING: CPT | Mod: PBBFAC | Performed by: INTERNAL MEDICINE

## 2022-03-09 PROCEDURE — 93010 EKG 12-LEAD: ICD-10-PCS | Mod: S$PBB,,, | Performed by: INTERNAL MEDICINE

## 2022-03-09 PROCEDURE — 93010 ELECTROCARDIOGRAM REPORT: CPT | Mod: S$PBB,,, | Performed by: INTERNAL MEDICINE

## 2022-03-09 PROCEDURE — 99999 PR PBB SHADOW E&M-EST. PATIENT-LVL III: ICD-10-PCS | Mod: PBBFAC,,, | Performed by: INTERNAL MEDICINE

## 2022-03-09 PROCEDURE — 99213 OFFICE O/P EST LOW 20 MIN: CPT | Mod: PBBFAC | Performed by: INTERNAL MEDICINE

## 2022-03-09 PROCEDURE — 99999 PR PBB SHADOW E&M-EST. PATIENT-LVL III: CPT | Mod: PBBFAC,,, | Performed by: INTERNAL MEDICINE

## 2022-03-09 PROCEDURE — 99204 PR OFFICE/OUTPT VISIT, NEW, LEVL IV, 45-59 MIN: ICD-10-PCS | Mod: S$PBB,,, | Performed by: INTERNAL MEDICINE

## 2022-03-09 RX ORDER — MAGNESIUM 200 MG
TABLET ORAL ONCE
COMMUNITY

## 2022-03-09 NOTE — PROGRESS NOTES
Subjective:    Patient ID:  Maryann Grant is a 66 y.o. female who presents for evaluation of No chief complaint on file.      HPI     Followed by Dr Cochran  Maryann Grant is a 66 y.o. female with multiple medical diagnoses as listed in the medical history and problem list that presents for annual exam. Pt has been doing well since her last visit. She has a good appetite and eats well. She does not exercise. She sleeps for ~4-6 hours nightly, but it is broken. Pt does take OTC supplements, which are MVI. She does have any current stressors, but prays to de-stress. Pt is not UTD on age appropriate CA screening.     Referred by Dr Wang for TIA    Echo 12/31/19  · Normal left ventricular systolic function. The estimated ejection fraction is 65%  · Normal LV diastolic function.  · Normal right ventricular systolic function.    3/9/22 Reports a recent episode where her vision went black and then returned. Denies CP, SOB, or palpitations  EKG NSR LAE  BP elevated - controlled by home readings    Review of Systems   Constitutional: Negative for decreased appetite.   HENT: Negative for ear discharge.    Eyes: Negative for blurred vision.   Respiratory: Negative for hemoptysis.    Endocrine: Negative for polyphagia.   Hematologic/Lymphatic: Negative for adenopathy.   Skin: Negative for color change.   Musculoskeletal: Negative for joint swelling.   Genitourinary: Negative for bladder incontinence.   Neurological: Negative for brief paralysis.   Psychiatric/Behavioral: Negative for hallucinations.   Allergic/Immunologic: Negative for hives.        Objective:    Physical Exam  Constitutional:       Appearance: She is well-developed.   HENT:      Head: Normocephalic and atraumatic.   Eyes:      Conjunctiva/sclera: Conjunctivae normal.      Pupils: Pupils are equal, round, and reactive to light.   Cardiovascular:      Rate and Rhythm: Normal rate.      Pulses: Intact distal pulses.      Heart sounds: Normal heart sounds.    Pulmonary:      Effort: Pulmonary effort is normal.      Breath sounds: Normal breath sounds.   Abdominal:      General: Bowel sounds are normal.      Palpations: Abdomen is soft.   Musculoskeletal:         General: Normal range of motion.      Cervical back: Normal range of motion and neck supple.   Skin:     General: Skin is warm and dry.   Neurological:      Mental Status: She is alert and oriented to person, place, and time.           Assessment:       1. TIA (transient ischemic attack)         Plan:       Echo with bubble study to r/o PFO  Event monitor to r/o PAF

## 2022-03-10 ENCOUNTER — CLINICAL SUPPORT (OUTPATIENT)
Dept: CARDIOLOGY | Facility: HOSPITAL | Age: 67
End: 2022-03-10
Attending: INTERNAL MEDICINE
Payer: MEDICARE

## 2022-03-10 DIAGNOSIS — G45.9 TIA (TRANSIENT ISCHEMIC ATTACK): ICD-10-CM

## 2022-03-15 ENCOUNTER — HOSPITAL ENCOUNTER (OUTPATIENT)
Dept: RADIOLOGY | Facility: HOSPITAL | Age: 67
Discharge: HOME | End: 2022-03-15
Attending: PSYCHIATRY & NEUROLOGY
Payer: MEDICARE

## 2022-03-15 DIAGNOSIS — G43.109 MIGRAINE WITH AURA, NOT INTRACTABLE, WITHOUT STATUS MIGRAINOSUS: ICD-10-CM

## 2022-03-15 DIAGNOSIS — G45.9 TRANSIENT CEREBRAL ISCHEMIC ATTACK, UNSPECIFIED: ICD-10-CM

## 2022-03-15 PROCEDURE — 93880 EXTRACRANIAL BILAT STUDY: CPT | Mod: 26,,, | Performed by: RADIOLOGY

## 2022-03-15 PROCEDURE — 93880 US CAROTID BILATERAL: ICD-10-PCS | Mod: 26,,, | Performed by: RADIOLOGY

## 2022-03-15 PROCEDURE — 93880 EXTRACRANIAL BILAT STUDY: CPT | Mod: TC

## 2022-03-17 ENCOUNTER — PATIENT MESSAGE (OUTPATIENT)
Dept: CARDIOLOGY | Facility: CLINIC | Age: 67
End: 2022-03-17

## 2022-03-17 ENCOUNTER — HOSPITAL ENCOUNTER (OUTPATIENT)
Dept: CARDIOLOGY | Facility: HOSPITAL | Age: 67
Discharge: HOME | End: 2022-03-17
Attending: INTERNAL MEDICINE
Payer: MEDICARE

## 2022-03-17 DIAGNOSIS — G45.9 TIA (TRANSIENT ISCHEMIC ATTACK): ICD-10-CM

## 2022-03-17 LAB
ASCENDING AORTA: 2.86 CM
AV INDEX (PROSTH): 0.97
AV MEAN GRADIENT: 5 MMHG
AV PEAK GRADIENT: 8 MMHG
AV VALVE AREA: 2.91 CM2
AV VELOCITY RATIO: 0.98
CV ECHO LV RWT: 0.5 CM
DOP CALC AO PEAK VEL: 1.43 M/S
DOP CALC AO VTI: 33.96 CM
DOP CALC LVOT AREA: 3 CM2
DOP CALC LVOT DIAMETER: 1.95 CM
DOP CALC LVOT PEAK VEL: 1.4 M/S
DOP CALC LVOT STROKE VOLUME: 98.71 CM3
DOP CALCLVOT PEAK VEL VTI: 33.07 CM
E WAVE DECELERATION TIME: 184.26 MSEC
E/A RATIO: 0.97
E/E' RATIO: 10.67 M/S
ECHO LV POSTERIOR WALL: 1.02 CM (ref 0.6–1.1)
EJECTION FRACTION: 65 %
FRACTIONAL SHORTENING: 40 % (ref 28–44)
INTERVENTRICULAR SEPTUM: 1.09 CM (ref 0.6–1.1)
IVRT: 83.04 MSEC
LA MAJOR: 4.84 CM
LA MINOR: 5.06 CM
LA WIDTH: 4.26 CM
LEFT ATRIUM SIZE: 3.27 CM
LEFT ATRIUM VOLUME: 58.58 CM3
LEFT INTERNAL DIMENSION IN SYSTOLE: 2.47 CM (ref 2.1–4)
LEFT VENTRICLE DIASTOLIC VOLUME: 74.01 ML
LEFT VENTRICLE SYSTOLIC VOLUME: 21.77 ML
LEFT VENTRICULAR INTERNAL DIMENSION IN DIASTOLE: 4.09 CM (ref 3.5–6)
LEFT VENTRICULAR MASS: 141.97 G
LV LATERAL E/E' RATIO: 9.6 M/S
LV SEPTAL E/E' RATIO: 12 M/S
MV PEAK A VEL: 0.99 M/S
MV PEAK E VEL: 0.96 M/S
MV STENOSIS PRESSURE HALF TIME: 53.44 MS
MV VALVE AREA P 1/2 METHOD: 4.12 CM2
PV PEAK VELOCITY: 1.17 CM/S
RA MAJOR: 3.72 CM
RA PRESSURE: 3 MMHG
RA WIDTH: 2.26 CM
RIGHT VENTRICULAR END-DIASTOLIC DIMENSION: 2.37 CM
TDI LATERAL: 0.1 M/S
TDI SEPTAL: 0.08 M/S
TDI: 0.09 M/S
TRICUSPID ANNULAR PLANE SYSTOLIC EXCURSION: 2.12 CM

## 2022-03-17 PROCEDURE — 93306 ECHO (CUPID ONLY): ICD-10-PCS | Mod: 26,,, | Performed by: INTERNAL MEDICINE

## 2022-03-17 PROCEDURE — 93306 TTE W/DOPPLER COMPLETE: CPT | Mod: 26,,, | Performed by: INTERNAL MEDICINE

## 2022-03-17 PROCEDURE — 93306 TTE W/DOPPLER COMPLETE: CPT

## 2022-05-30 ENCOUNTER — PATIENT MESSAGE (OUTPATIENT)
Dept: ADMINISTRATIVE | Facility: HOSPITAL | Age: 67
End: 2022-05-30
Payer: MEDICARE

## 2022-07-22 ENCOUNTER — TELEPHONE (OUTPATIENT)
Dept: FAMILY MEDICINE | Facility: CLINIC | Age: 67
End: 2022-07-22

## 2022-07-22 NOTE — TELEPHONE ENCOUNTER
----- Message from Chana Barnes sent at 7/22/2022 12:24 PM CDT -----  Regarding: self  .Type: Patient Call Back    Who called: self     What is the request in detail: states she was positive for covid last week and shes been taking musinex and cough meds that was given from urgent care. Constant coughing fits, nasal congestion and head congestion. Requesting rx. Please advise.     Can the clinic reply by [facility]? No     Would the patient rather a call back or a response via [facility]?  Call     Best call back number: .[phone]    [facility] - [address] [address]  Phone: [phone] Fax: [phone]

## 2022-10-24 ENCOUNTER — TELEPHONE (OUTPATIENT)
Dept: FAMILY MEDICINE | Facility: CLINIC | Age: 67
End: 2022-10-24
Payer: MEDICARE

## 2022-10-24 NOTE — TELEPHONE ENCOUNTER
----- Message from Chana Barnes sent at 10/24/2022 11:28 AM CDT -----  .Type:  Needs Medical Advice    Who Called:  self   Symptoms (please be specific):  fever - pain in left side   How long has patient had these symptoms:  Saturday     Pharmacy name and phone #:   .  [facility] [address] [facility]  [address] [address]  Phone: [phone] Fax: [phone]      Would the patient rather a call back or a response via [facility]?  Call   Best Call Back Number:  [phone]

## 2022-10-24 NOTE — TELEPHONE ENCOUNTER
Pt having right sided flank/ waist level pain ( level 2 currently) x 2 days, laying down hurts worse than standing. Also had chills and recurrent fever.

## 2022-10-28 ENCOUNTER — HOSPITAL ENCOUNTER (OUTPATIENT)
Dept: RADIOLOGY | Facility: HOSPITAL | Age: 67
Discharge: HOME | End: 2022-10-28
Attending: NURSE PRACTITIONER
Payer: MEDICARE

## 2022-10-28 ENCOUNTER — OFFICE VISIT (OUTPATIENT)
Dept: FAMILY MEDICINE | Facility: CLINIC | Age: 67
End: 2022-10-28
Payer: MEDICARE

## 2022-10-28 VITALS
BODY MASS INDEX: 25.5 KG/M2 | SYSTOLIC BLOOD PRESSURE: 138 MMHG | WEIGHT: 182.13 LBS | HEIGHT: 71 IN | RESPIRATION RATE: 16 BRPM | OXYGEN SATURATION: 98 % | HEART RATE: 89 BPM | TEMPERATURE: 98 F | DIASTOLIC BLOOD PRESSURE: 70 MMHG

## 2022-10-28 DIAGNOSIS — R10.9: Primary | ICD-10-CM

## 2022-10-28 DIAGNOSIS — R10.9: ICD-10-CM

## 2022-10-28 PROBLEM — Z86.73 HISTORY OF TIA (TRANSIENT ISCHEMIC ATTACK): Status: ACTIVE | Noted: 2022-03-09

## 2022-10-28 LAB
BILIRUB SERPL-MCNC: NEGATIVE MG/DL
BILIRUB UR QL STRIP: NEGATIVE
BLOOD URINE, POC: ABNORMAL
CLARITY UR: CLEAR
CLARITY, POC UA: CLEAR
COLOR UR: YELLOW
COLOR, POC UA: YELLOW
GLUCOSE UR QL STRIP: NEGATIVE
GLUCOSE UR QL STRIP: NEGATIVE
HGB UR QL STRIP: NEGATIVE
KETONES UR QL STRIP: NEGATIVE
KETONES UR QL STRIP: NEGATIVE
LEUKOCYTE ESTERASE UR QL STRIP: ABNORMAL
LEUKOCYTE ESTERASE URINE, POC: ABNORMAL
MICROSCOPIC COMMENT: NORMAL
NITRITE UR QL STRIP: NEGATIVE
NITRITE, POC UA: NEGATIVE
PH UR STRIP: 7 [PH] (ref 5–8)
PH, POC UA: 6
PROT UR QL STRIP: NEGATIVE
PROTEIN, POC: ABNORMAL
SP GR UR STRIP: 1.02 (ref 1–1.03)
SPECIFIC GRAVITY, POC UA: 1.01
SQUAMOUS #/AREA URNS HPF: 4 /HPF
URN SPEC COLLECT METH UR: ABNORMAL
UROBILINOGEN UR STRIP-ACNC: NEGATIVE EU/DL
UROBILINOGEN, POC UA: NEGATIVE
WBC #/AREA URNS HPF: 0 /HPF (ref 0–5)

## 2022-10-28 PROCEDURE — 74176 CT ABD & PELVIS W/O CONTRAST: CPT | Mod: 26,,, | Performed by: INTERNAL MEDICINE

## 2022-10-28 PROCEDURE — 99999 PR PBB SHADOW E&M-EST. PATIENT-LVL III: ICD-10-PCS | Mod: PBBFAC,,, | Performed by: NURSE PRACTITIONER

## 2022-10-28 PROCEDURE — 81000 URINALYSIS NONAUTO W/SCOPE: CPT | Performed by: NURSE PRACTITIONER

## 2022-10-28 PROCEDURE — 99999 PR PBB SHADOW E&M-EST. PATIENT-LVL III: CPT | Mod: PBBFAC,,, | Performed by: NURSE PRACTITIONER

## 2022-10-28 PROCEDURE — 99213 OFFICE O/P EST LOW 20 MIN: CPT | Mod: PBBFAC,PO | Performed by: NURSE PRACTITIONER

## 2022-10-28 PROCEDURE — 99214 OFFICE O/P EST MOD 30 MIN: CPT | Mod: S$PBB,,, | Performed by: NURSE PRACTITIONER

## 2022-10-28 PROCEDURE — 74176 CT ABD & PELVIS W/O CONTRAST: CPT | Mod: TC

## 2022-10-28 PROCEDURE — 74176 CT ABDOMEN PELVIS WITHOUT CONTRAST: ICD-10-PCS | Mod: 26,,, | Performed by: INTERNAL MEDICINE

## 2022-10-28 PROCEDURE — 87086 URINE CULTURE/COLONY COUNT: CPT | Performed by: NURSE PRACTITIONER

## 2022-10-28 PROCEDURE — 99214 PR OFFICE/OUTPT VISIT, EST, LEVL IV, 30-39 MIN: ICD-10-PCS | Mod: S$PBB,,, | Performed by: NURSE PRACTITIONER

## 2022-10-28 PROCEDURE — 81002 URINALYSIS NONAUTO W/O SCOPE: CPT | Mod: PBBFAC,PO | Performed by: NURSE PRACTITIONER

## 2022-10-28 NOTE — PROGRESS NOTES
Subjective:      Patient ID: Maryann Grant is a 67 y.o. female.  New to me but seen previously in clinic by a fellow provider. Pt presents to clinic with acute right flank pain that began suddenly 5 days ago. Pain has been on and off with fever, nausea, abdominal pain and urinary symptoms.     Abdominal Pain  This is a new problem. The current episode started in the past 7 days. The onset quality is sudden. The problem occurs 2 to 4 times per day. Duration: minutes to hours. The problem has been waxing and waning. The pain is located in the right flank. The pain is at a severity of 10/10. The pain is severe. The quality of the pain is colicky, aching, sharp and cramping. The abdominal pain radiates to the RUQ. Associated symptoms include anorexia, dysuria, a fever, frequency, myalgias and nausea. Pertinent negatives include no arthralgias, belching, constipation, diarrhea, flatus, headaches, hematochezia, hematuria, melena, vomiting or weight loss. The pain is aggravated by being still. The pain is relieved by Movement. She has tried acetaminophen for the symptoms. The treatment provided no relief. There is no history of abdominal surgery, colon cancer, Crohn's disease, gallstones, GERD, irritable bowel syndrome, pancreatitis, PUD or ulcerative colitis. Patient's medical history does not include kidney stones and UTI.   Fever   This is a new problem. The current episode started in the past 7 days. The problem occurs intermittently. The problem has been waxing and waning. The maximum temperature noted was 101 to 101.9 F. The temperature was taken using an oral thermometer. Associated symptoms include abdominal pain, nausea and urinary pain. Pertinent negatives include no chest pain, congestion, coughing, diarrhea, ear pain, headaches, muscle aches, rash, sleepiness, sore throat, vomiting or wheezing. She has tried acetaminophen for the symptoms. The treatment provided moderate relief.   Risk factors: no contaminated  food, no contaminated water, no hx of cancer, no immunosuppression, no occupational exposure, no recent sickness, no recent travel and no sick contacts    Urinary Tract Infection   This is a new problem. The current episode started in the past 7 days. The problem occurs intermittently. The problem has been waxing and waning. The quality of the pain is described as aching, shooting and stabbing. The pain is moderate. The maximum temperature recorded prior to her arrival was 101 - 101.9 F. The fever has been present for 3 - 4 days. She is Sexually active. There is No history of pyelonephritis. Associated symptoms include chills, flank pain, frequency, hesitancy, nausea and urgency. Pertinent negatives include no behavior changes, discharge, hematuria, sweats, vomiting, weight loss, bubble bath use, constipation, rash or withholding. She has tried acetaminophen for the symptoms. The treatment provided no relief. There is no history of diabetes mellitus, hypertension, kidney stones, recurrent UTIs, STD or urinary stasis.   Review of Systems   Constitutional:  Positive for appetite change, chills, fatigue and fever. Negative for activity change, diaphoresis, unexpected weight change and weight loss.   HENT:  Negative for nasal congestion, ear pain and sore throat.    Respiratory:  Negative for cough, chest tightness, shortness of breath and wheezing.    Cardiovascular:  Negative for chest pain and palpitations.   Gastrointestinal:  Positive for abdominal pain, anorexia and nausea. Negative for abdominal distention, anal bleeding, blood in stool, change in bowel habit, constipation, diarrhea, flatus, hematochezia, melena, rectal pain, vomiting, reflux, fecal incontinence and change in bowel habit.   Genitourinary:  Positive for decreased urine volume, difficulty urinating, dysuria, flank pain, frequency, hesitancy, nocturia and urgency. Negative for bladder incontinence, genital sores, hematuria, pelvic pain, vaginal  "bleeding, vaginal discharge, vaginal pain and vaginal dryness.   Musculoskeletal:  Positive for myalgias. Negative for arthralgias, back pain and gait problem.   Integumentary:  Negative for rash.   Neurological:  Negative for headaches.   All other systems reviewed and are negative.      Objective:     Vitals:    10/28/22 0928   BP: 138/70   Pulse: 89   Resp: 16   Temp: 97.6 °F (36.4 °C)   TempSrc: Oral   SpO2: 98%   Weight: 82.6 kg (182 lb 1.6 oz)   Height: 5' 11" (1.803 m)     Physical Exam  Vitals and nursing note reviewed.   Constitutional:       General: She is not in acute distress.     Appearance: Normal appearance. She is well-developed, well-groomed and normal weight. She is not ill-appearing.   HENT:      Head: Normocephalic and atraumatic.      Right Ear: External ear normal.      Left Ear: External ear normal.      Nose: Nose normal.      Mouth/Throat:      Lips: Pink.      Mouth: Mucous membranes are moist.   Eyes:      General: Lids are normal. Vision grossly intact. Gaze aligned appropriately.      Conjunctiva/sclera: Conjunctivae normal.      Pupils: Pupils are equal, round, and reactive to light.   Neck:      Trachea: Phonation normal.   Cardiovascular:      Rate and Rhythm: Normal rate and regular rhythm.      Heart sounds: Normal heart sounds.   Pulmonary:      Effort: Pulmonary effort is normal. No accessory muscle usage or respiratory distress.      Breath sounds: Normal breath sounds and air entry.   Abdominal:      General: Abdomen is flat. Bowel sounds are normal. There is no distension.      Palpations: Abdomen is soft.      Tenderness: There is no abdominal tenderness. There is right CVA tenderness. There is no left CVA tenderness, guarding or rebound.   Musculoskeletal:      Cervical back: Neck supple.      Right lower leg: No edema.      Left lower leg: No edema.   Skin:     General: Skin is warm and dry.      Findings: No rash.   Neurological:      General: No focal deficit present.     "  Mental Status: She is alert and oriented to person, place, and time. Mental status is at baseline.      Sensory: Sensation is intact.      Motor: Motor function is intact.      Coordination: Coordination is intact.      Gait: Gait is intact.   Psychiatric:         Attention and Perception: Attention and perception normal.         Mood and Affect: Mood and affect normal.         Speech: Speech normal.         Behavior: Behavior normal. Behavior is cooperative.         Thought Content: Thought content normal.         Cognition and Memory: Cognition and memory normal.         Judgment: Judgment normal.     Assessment and Plan:     1. Abdominal cramping in right flank  No obvious infection or GI symptoms, suspect nephrolithiasis, stat CT and labs now  Urine dipstick shows positive for RBC's, positive for protein, and positive for leukocytes.   Treatment has included: OTC medication  Calculus passed during this episode: unknown  Previous calculus and type: no  Dietary contributing factors: poor fluid intake   For the potential kidney stone, I recommended pushing plenty of fluids, 2 quarts daily, straining the urine (and bring in the stone if it passes) and analgesics per orders. An IVP will be scheduled, then see the patient for return office visit. Call if severe pain, fever, vomiting, gross hematuria or dysuria occurs.  - CT Abdomen Pelvis  Without Contrast; Future  - Urinalysis  - Urine Culture High Risk           CHUN Matt, FNP-C  Family/Internal Medicine  [facility]

## 2022-10-28 NOTE — LETTER
AUTHORIZATION FOR RELEASE OF   CONFIDENTIAL INFORMATION    Dear Dr Wang,    We are seeing Maryann Grant, date of birth 1955, in the clinic at [facility] FAMILY MEDICINE/INTERNAL MED. Risa Cochran MD is the patient's PCP. Maryann Grant has an outstanding lab/procedure at the time we reviewed her chart. In order to help keep her health information updated, she has authorized us to request the following medical record(s):        (  )  MAMMOGRAM                                      (  )  COLONOSCOPY      (  )  PAP SMEAR                                          (  )  OUTSIDE LAB RESULTS     (  )  DEXA SCAN                                          (  )  EYE EXAM            (  )  FOOT EXAM                                          (  )  ENTIRE RECORD     (  )  OUTSIDE IMMUNIZATIONS                 ( X )  ____recent labs___________         Please fax records to ArseniosRisa hurst MD, [phone]     If you have any questions, please contact [facility] at [phone].           Patient Name: Maryann Grant  : 1955  Patient Phone #: [phone]

## 2022-10-30 LAB — BACTERIA UR CULT: NORMAL

## 2022-10-31 ENCOUNTER — PATIENT MESSAGE (OUTPATIENT)
Dept: FAMILY MEDICINE | Facility: CLINIC | Age: 67
End: 2022-10-31
Payer: MEDICARE

## 2022-11-01 ENCOUNTER — TELEPHONE (OUTPATIENT)
Dept: FAMILY MEDICINE | Facility: CLINIC | Age: 67
End: 2022-11-01
Payer: MEDICARE

## 2022-11-01 ENCOUNTER — PATIENT MESSAGE (OUTPATIENT)
Dept: FAMILY MEDICINE | Facility: CLINIC | Age: 67
End: 2022-11-01
Payer: MEDICARE

## 2022-11-01 NOTE — TELEPHONE ENCOUNTER
----- Message from Erika Brito NP sent at 10/28/2022  3:36 PM CDT -----  Your CT shows that you have small kidney stones but none that are obstructing or causing problems in you kidneys. There are also cyst there also. Make sure you are drinking a lot of water. There is no other cause for concern at this time.

## 2023-02-24 ENCOUNTER — PATIENT MESSAGE (OUTPATIENT)
Dept: ADMINISTRATIVE | Facility: HOSPITAL | Age: 68
End: 2023-02-24
Payer: MEDICARE

## 2023-04-13 ENCOUNTER — PATIENT MESSAGE (OUTPATIENT)
Dept: ADMINISTRATIVE | Facility: HOSPITAL | Age: 68
End: 2023-04-13
Payer: MEDICARE

## 2023-04-20 ENCOUNTER — PATIENT MESSAGE (OUTPATIENT)
Dept: FAMILY MEDICINE | Facility: CLINIC | Age: 68
End: 2023-04-20
Payer: MEDICARE

## 2023-04-20 DIAGNOSIS — Z86.73 HISTORY OF TIA (TRANSIENT ISCHEMIC ATTACK): Primary | ICD-10-CM

## 2023-04-20 RX ORDER — PRAVASTATIN SODIUM 20 MG/1
20 TABLET ORAL DAILY
Qty: 90 TABLET | Refills: 3 | Status: SHIPPED | OUTPATIENT
Start: 2023-04-20 | End: 2024-04-19

## 2023-09-12 ENCOUNTER — OFFICE VISIT (OUTPATIENT)
Dept: FAMILY MEDICINE | Facility: CLINIC | Age: 68
End: 2023-09-12
Payer: MEDICARE

## 2023-09-12 DIAGNOSIS — J32.9 BACTERIAL SINUSITIS: Primary | ICD-10-CM

## 2023-09-12 DIAGNOSIS — B96.89 BACTERIAL SINUSITIS: Primary | ICD-10-CM

## 2023-09-12 PROCEDURE — 99213 PR OFFICE/OUTPT VISIT, EST, LEVL III, 20-29 MIN: ICD-10-PCS | Mod: 95,,, | Performed by: NURSE PRACTITIONER

## 2023-09-12 PROCEDURE — 99213 OFFICE O/P EST LOW 20 MIN: CPT | Mod: 95,,, | Performed by: NURSE PRACTITIONER

## 2023-09-12 RX ORDER — AMOXICILLIN 500 MG/1
500 TABLET, FILM COATED ORAL EVERY 12 HOURS
Qty: 20 TABLET | Refills: 0 | Status: SHIPPED | OUTPATIENT
Start: 2023-09-12 | End: 2023-09-22

## 2023-09-12 RX ORDER — CETIRIZINE HYDROCHLORIDE 10 MG/1
10 TABLET ORAL NIGHTLY
Qty: 30 TABLET | Refills: 0 | Status: SHIPPED | OUTPATIENT
Start: 2023-09-12 | End: 2023-10-09

## 2023-09-12 RX ORDER — FLUTICASONE PROPIONATE 50 MCG
1 SPRAY, SUSPENSION (ML) NASAL DAILY
Qty: 18.2 ML | Refills: 0 | Status: SHIPPED | OUTPATIENT
Start: 2023-09-12 | End: 2023-10-09

## 2023-09-12 NOTE — PROGRESS NOTES
The patient location is: [place]   The chief complaint leading to consultation is: sinus problem.     Visit type: audiovisual    Face to Face time with patient: 7 minutes  15 minutes of total time spent on the encounter, which includes face to face time and non-face to face time preparing to see the patient (eg, review of tests), Obtaining and/or reviewing separately obtained history, Documenting clinical information in the electronic or other health record, Independently interpreting results (not separately reported) and communicating results to the patient/family/caregiver, or Care coordination (not separately reported).         Each patient to whom he or she provides medical services by telemedicine is:  (1) informed of the relationship between the physician and patient and the respective role of any other health care provider with respect to management of the patient; and (2) notified that he or she may decline to receive medical services by telemedicine and may withdraw from such care at any time.    Notes:   Subjective:       Patient ID: Maryann Grant is a 67 y.o. female.    Chief Complaint: Sinus Problem    HPI     Maryann Grant is a 67 y.o. female patient that presents to clinic with a chief complaint of sinus problem. Past medical and surgical history reviewed as listed. PCP is Risa Cochran MD , she is  new  to me.     Sinus Problem  This is a new problem. The current episode started in the past 7 days. The problem has been gradually improving since onset. The maximum temperature recorded prior to her arrival was 100.4 - 100.9 F (low grade fever 100.0). The pain is mild. Associated symptoms include congestion, headaches and sinus pressure. Pertinent negatives include no neck pain or shortness of breath. Past treatments include nasal decongestants (antihistamines). The treatment provided moderate relief.     Home COVID-19 test was negative.     Past Medical History:   Diagnosis Date    Closed left  arm fracture 02/05/2019    humeral, surgically repaired, metal plate present    Encounter for blood transfusion     History of bleeding ulcers     Intermittent memory loss 12/29/2019    reports no memory of event during & after [facility] from on 12/29/30 from 10:30am - 2:30pm      Past Surgical History:   Procedure Laterality Date    FRACTURE SURGERY Left 02/09/2019    humeral fracture, plate present    HYSTERECTOMY      TONSILLECTOMY      VAGINAL DELIVERY        Family History   Problem Relation Age of Onset    Heart disease Mother     Stroke Mother     Diabetes Mother     Heart disease Father     Hypertension Father       Review of patient's allergies indicates:  No Known Allergies  Review of Systems   Constitutional:  Negative for activity change and unexpected weight change.   HENT:  Positive for congestion and sinus pressure. Negative for hearing loss, rhinorrhea and trouble swallowing.    Eyes:  Negative for discharge and visual disturbance.   Respiratory:  Negative for chest tightness, shortness of breath and wheezing.    Cardiovascular:  Negative for chest pain and palpitations.   Gastrointestinal:  Negative for blood in stool, constipation, diarrhea and vomiting.   Endocrine: Negative for polydipsia and polyuria.   Genitourinary:  Negative for difficulty urinating, dysuria, hematuria and menstrual problem.   Musculoskeletal:  Negative for arthralgias, joint swelling and neck pain.   Neurological:  Positive for headaches. Negative for weakness.   Psychiatric/Behavioral:  Negative for confusion and dysphoric mood.        Objective:      There were no vitals filed for this visit.   Physical Exam  Constitutional:       General: She is not in acute distress.     Appearance: Normal appearance.      Comments: Limited physical examination due to nature of virtual/telemedicine visit.      Neurological:      Mental Status: She is alert and oriented to person, place, and time.   Psychiatric:         Mood and Affect: Mood  normal.         Behavior: Behavior normal.         Lab Results   Component Value Date    WBC 6.33 10/28/2022    HGB 13.8 10/28/2022    HCT 40.8 10/28/2022     10/28/2022    CHOL 217 (H) 12/30/2019    TRIG 83 12/30/2019    HDL 67 12/30/2019    ALT 22 10/28/2022    AST 20 10/28/2022     10/28/2022    K 4.2 10/28/2022     10/28/2022    CREATININE 0.9 10/28/2022    BUN 19 10/28/2022    CO2 27 10/28/2022    TSH 2.788 12/30/2019    INR 1.0 12/30/2019      Assessment:       1. Bacterial sinusitis        Plan:       Bacterial sinusitis  Recommend supportive care of symptoms.   -     amoxicillin (AMOXIL) 500 MG Tab; Take 1 tablet (500 mg total) by mouth every 12 (twelve) hours. for 10 days  Dispense: 20 tablet; Refill: 0  -     cetirizine (ZYRTEC) 10 MG tablet; Take 1 tablet (10 mg total) by mouth every evening.  Dispense: 30 tablet; Refill: 0  -     fluticasone propionate (FLONASE) 50 mcg/actuation nasal spray; 1 spray (50 mcg total) by Each Nostril route once daily.  Dispense: 18.2 mL; Refill: 0      Medication List with Changes/Refills   New Medications    AMOXICILLIN (AMOXIL) 500 MG TAB    Take 1 tablet (500 mg total) by mouth every 12 (twelve) hours. for 10 days    CETIRIZINE (ZYRTEC) 10 MG TABLET    Take 1 tablet (10 mg total) by mouth every evening.    FLUTICASONE PROPIONATE (FLONASE) 50 MCG/ACTUATION NASAL SPRAY    1 spray (50 mcg total) by Each Nostril route once daily.   Current Medications    MAGNESIUM 200 MG TAB    Take by mouth once.    MULTIVITAMIN CAPSULE    Take 1 capsule by mouth once daily.    PRAVASTATIN (PRAVACHOL) 20 MG TABLET    Take 1 tablet (20 mg total) by mouth once daily.    VITAMIN D3/VITAMIN K2, MK4, (K2 PLUS D3 ORAL)    Take by mouth.         Follow up if symptoms worsen or fail to improve, for Dr. Risa Cochran.        Kat West, DNP, APRN, FNP-C  [facility]

## 2023-09-15 ENCOUNTER — PATIENT MESSAGE (OUTPATIENT)
Dept: FAMILY MEDICINE | Facility: CLINIC | Age: 68
End: 2023-09-15
Payer: MEDICARE

## 2023-09-15 DIAGNOSIS — R05.1 ACUTE COUGH: Primary | ICD-10-CM

## 2023-09-18 RX ORDER — BENZONATATE 100 MG/1
100 CAPSULE ORAL 3 TIMES DAILY PRN
Qty: 30 CAPSULE | Refills: 0 | Status: SHIPPED | OUTPATIENT
Start: 2023-09-18 | End: 2023-09-28

## 2023-09-22 DIAGNOSIS — Z78.0 MENOPAUSE: ICD-10-CM

## 2023-10-09 DIAGNOSIS — B96.89 BACTERIAL SINUSITIS: ICD-10-CM

## 2023-10-09 DIAGNOSIS — J32.9 BACTERIAL SINUSITIS: ICD-10-CM

## 2023-10-09 RX ORDER — FLUTICASONE PROPIONATE 50 MCG
SPRAY, SUSPENSION (ML) NASAL
Qty: 16 ML | Refills: 0 | Status: SHIPPED | OUTPATIENT
Start: 2023-10-09

## 2023-10-09 RX ORDER — CETIRIZINE HYDROCHLORIDE 10 MG/1
10 TABLET ORAL NIGHTLY
Qty: 30 TABLET | Refills: 0 | Status: SHIPPED | OUTPATIENT
Start: 2023-10-09

## 2024-01-09 ENCOUNTER — HOSPITAL ENCOUNTER (OUTPATIENT)
Dept: RADIOLOGY | Facility: CLINIC | Age: 69
Discharge: HOME | End: 2024-01-09
Attending: INTERNAL MEDICINE
Payer: MEDICARE

## 2024-01-09 DIAGNOSIS — M81.0 AGE-RELATED OSTEOPOROSIS WITHOUT CURRENT PATHOLOGICAL FRACTURE: Primary | ICD-10-CM

## 2024-01-09 DIAGNOSIS — Z78.0 MENOPAUSE: ICD-10-CM

## 2024-01-09 PROCEDURE — 77080 DXA BONE DENSITY AXIAL: CPT | Mod: TC,PO

## 2024-01-09 PROCEDURE — 77080 DXA BONE DENSITY AXIAL: CPT | Mod: 26,,, | Performed by: INTERNAL MEDICINE

## 2024-01-09 RX ORDER — ALENDRONATE SODIUM 70 MG/1
70 TABLET ORAL
Qty: 12 TABLET | Refills: 3 | Status: SHIPPED | OUTPATIENT
Start: 2024-01-09 | End: 2024-02-20

## 2024-01-09 NOTE — PROGRESS NOTES
Your bone density test is consistent with a diagnosis of osteoporosis.  Please start a daily over-the-counter vitamin-D supplement of 2000 units, calcium 1200 mg, and start some weight-bearing exercises to help strengthen your bones to prevent any fractures.